# Patient Record
Sex: FEMALE | Race: WHITE | NOT HISPANIC OR LATINO | URBAN - METROPOLITAN AREA
[De-identification: names, ages, dates, MRNs, and addresses within clinical notes are randomized per-mention and may not be internally consistent; named-entity substitution may affect disease eponyms.]

---

## 2017-04-03 ENCOUNTER — INPATIENT (INPATIENT)
Facility: HOSPITAL | Age: 82
LOS: 2 days | Discharge: HOME | DRG: 444 | End: 2017-04-06
Attending: SURGERY | Admitting: SURGERY
Payer: MEDICARE

## 2017-04-03 VITALS
WEIGHT: 130.95 LBS | TEMPERATURE: 98 F | DIASTOLIC BLOOD PRESSURE: 87 MMHG | RESPIRATION RATE: 18 BRPM | OXYGEN SATURATION: 96 % | HEART RATE: 86 BPM | SYSTOLIC BLOOD PRESSURE: 148 MMHG

## 2017-04-03 LAB
ALBUMIN SERPL ELPH-MCNC: 3.1 G/DL — LOW (ref 3.4–5)
ALP SERPL-CCNC: 147 U/L — HIGH (ref 40–120)
ALT FLD-CCNC: 44 U/L — HIGH (ref 12–42)
ANION GAP SERPL CALC-SCNC: 11 MMOL/L — SIGNIFICANT CHANGE UP (ref 9–16)
APTT BLD: 25.7 SEC — LOW (ref 27.5–37.4)
AST SERPL-CCNC: 42 U/L — HIGH (ref 15–37)
BASOPHILS NFR BLD AUTO: 0.1 % — SIGNIFICANT CHANGE UP (ref 0–2)
BILIRUB DIRECT SERPL-MCNC: 0.35 MG/DL — HIGH
BILIRUB INDIRECT FLD-MCNC: 0.4 MG/DL — SIGNIFICANT CHANGE UP (ref 0.2–1)
BILIRUB SERPL-MCNC: 0.8 MG/DL — SIGNIFICANT CHANGE UP (ref 0.2–1.2)
BUN SERPL-MCNC: 24 MG/DL — HIGH (ref 7–23)
CALCIUM SERPL-MCNC: 8.8 MG/DL — SIGNIFICANT CHANGE UP (ref 8.5–10.5)
CHLORIDE SERPL-SCNC: 110 MMOL/L — HIGH (ref 96–108)
CO2 SERPL-SCNC: 22 MMOL/L — SIGNIFICANT CHANGE UP (ref 22–31)
CREAT SERPL-MCNC: 0.64 MG/DL — SIGNIFICANT CHANGE UP (ref 0.5–1.3)
GLUCOSE SERPL-MCNC: 118 MG/DL — HIGH (ref 70–99)
HCT VFR BLD CALC: 31.5 % — LOW (ref 34.5–45)
HGB BLD-MCNC: 10 G/DL — LOW (ref 11.5–15.5)
INR BLD: 1.08 — SIGNIFICANT CHANGE UP (ref 0.88–1.16)
LIDOCAIN IGE QN: 7413 U/L — HIGH (ref 73–393)
LYMPHOCYTES # BLD AUTO: 8.9 % — LOW (ref 13–44)
MCHC RBC-ENTMCNC: 22.6 PG — LOW (ref 27–34)
MCHC RBC-ENTMCNC: 31.7 G/DL — LOW (ref 32–36)
MCV RBC AUTO: 71.3 FL — LOW (ref 80–100)
MONOCYTES NFR BLD AUTO: 5.1 % — SIGNIFICANT CHANGE UP (ref 2–14)
NEUTROPHILS NFR BLD AUTO: 85.9 % — HIGH (ref 43–77)
PLATELET # BLD AUTO: 203 K/UL — SIGNIFICANT CHANGE UP (ref 150–400)
POTASSIUM SERPL-MCNC: 3.5 MMOL/L — SIGNIFICANT CHANGE UP (ref 3.5–5.3)
POTASSIUM SERPL-SCNC: 3.5 MMOL/L — SIGNIFICANT CHANGE UP (ref 3.5–5.3)
PROT SERPL-MCNC: 6.2 G/DL — LOW (ref 6.4–8.2)
PROTHROM AB SERPL-ACNC: 12 SEC — SIGNIFICANT CHANGE UP (ref 9.8–12.7)
RBC # BLD: 4.42 M/UL — SIGNIFICANT CHANGE UP (ref 3.8–5.2)
RBC # FLD: 18.2 % — HIGH (ref 10.3–16.9)
SODIUM SERPL-SCNC: 143 MMOL/L — SIGNIFICANT CHANGE UP (ref 135–145)
WBC # BLD: 10.5 K/UL — SIGNIFICANT CHANGE UP (ref 3.8–10.5)
WBC # FLD AUTO: 10.5 K/UL — SIGNIFICANT CHANGE UP (ref 3.8–10.5)

## 2017-04-03 PROCEDURE — 71010: CPT | Mod: 26

## 2017-04-03 PROCEDURE — 99285 EMERGENCY DEPT VISIT HI MDM: CPT | Mod: 25

## 2017-04-03 PROCEDURE — 93010 ELECTROCARDIOGRAM REPORT: CPT

## 2017-04-03 RX ORDER — MORPHINE SULFATE 50 MG/1
4 CAPSULE, EXTENDED RELEASE ORAL ONCE
Qty: 0 | Refills: 0 | Status: DISCONTINUED | OUTPATIENT
Start: 2017-04-03 | End: 2017-04-03

## 2017-04-03 RX ORDER — SODIUM CHLORIDE 9 MG/ML
1000 INJECTION INTRAMUSCULAR; INTRAVENOUS; SUBCUTANEOUS ONCE
Qty: 0 | Refills: 0 | Status: COMPLETED | OUTPATIENT
Start: 2017-04-03 | End: 2017-04-03

## 2017-04-03 RX ADMIN — SODIUM CHLORIDE 1000 MILLILITER(S): 9 INJECTION INTRAMUSCULAR; INTRAVENOUS; SUBCUTANEOUS at 23:32

## 2017-04-03 NOTE — ED PROVIDER NOTE - OBJECTIVE STATEMENT
88 y/o f hx HTN presents c/o diffuse abd pain, n/v today.  Pt stating she was seen by her GI today, had u/s which shows gallstones and told her labs are indicating pancreatitis.  Pt stating if she doesn't eat or drink she isn't vomiting or uncomfortable.  Denies fever, diarrhea, CP, SOB, all other ROS negative.

## 2017-04-03 NOTE — ED PROVIDER NOTE - ATTENDING CONTRIBUTION TO CARE
87F hx of HTN abdominal pain, nausea, vomiting for past day. Diffuse. Went to GI doctor did labs, ultrasound. Lipase is elevated. Pt with gallstone pancreatitis. Needs repeat ultrasound, labs. Surgery to consult under Dr. Lin.

## 2017-04-04 LAB
ALBUMIN SERPL ELPH-MCNC: 2.7 G/DL — LOW (ref 3.4–5)
ALP SERPL-CCNC: 131 U/L — HIGH (ref 40–120)
ALT FLD-CCNC: 38 U/L — SIGNIFICANT CHANGE UP (ref 12–42)
ANION GAP SERPL CALC-SCNC: 8 MMOL/L — LOW (ref 9–16)
APPEARANCE UR: CLEAR — SIGNIFICANT CHANGE UP
APTT BLD: 28.7 SEC — SIGNIFICANT CHANGE UP (ref 27.5–37.4)
AST SERPL-CCNC: 36 U/L — SIGNIFICANT CHANGE UP (ref 15–37)
BASOPHILS NFR BLD AUTO: 0.2 % — SIGNIFICANT CHANGE UP (ref 0–2)
BILIRUB SERPL-MCNC: 1.2 MG/DL — SIGNIFICANT CHANGE UP (ref 0.2–1.2)
BILIRUB UR-MCNC: NEGATIVE — SIGNIFICANT CHANGE UP
BUN SERPL-MCNC: 19 MG/DL — SIGNIFICANT CHANGE UP (ref 7–23)
CALCIUM SERPL-MCNC: 8.9 MG/DL — SIGNIFICANT CHANGE UP (ref 8.5–10.5)
CHLORIDE SERPL-SCNC: 112 MMOL/L — HIGH (ref 96–108)
CO2 SERPL-SCNC: 23 MMOL/L — SIGNIFICANT CHANGE UP (ref 22–31)
COLOR SPEC: YELLOW — SIGNIFICANT CHANGE UP
CREAT SERPL-MCNC: 0.61 MG/DL — SIGNIFICANT CHANGE UP (ref 0.5–1.3)
DIFF PNL FLD: NEGATIVE — SIGNIFICANT CHANGE UP
GLUCOSE SERPL-MCNC: 94 MG/DL — SIGNIFICANT CHANGE UP (ref 70–99)
GLUCOSE UR QL: NEGATIVE — SIGNIFICANT CHANGE UP
HBA1C BLD-MCNC: 6 % — HIGH (ref 4.8–5.6)
HCT VFR BLD CALC: 30.4 % — LOW (ref 34.5–45)
HGB BLD-MCNC: 9.6 G/DL — LOW (ref 11.5–15.5)
INR BLD: 1.22 — HIGH (ref 0.88–1.16)
KETONES UR-MCNC: NEGATIVE — SIGNIFICANT CHANGE UP
LEUKOCYTE ESTERASE UR-ACNC: NEGATIVE — SIGNIFICANT CHANGE UP
LIDOCAIN IGE QN: 5277 U/L — HIGH (ref 73–393)
LYMPHOCYTES # BLD AUTO: 14.8 % — SIGNIFICANT CHANGE UP (ref 13–44)
MAGNESIUM SERPL-MCNC: 1.8 MG/DL — SIGNIFICANT CHANGE UP (ref 1.6–2.4)
MCHC RBC-ENTMCNC: 22.8 PG — LOW (ref 27–34)
MCHC RBC-ENTMCNC: 31.6 G/DL — LOW (ref 32–36)
MCV RBC AUTO: 72.2 FL — LOW (ref 80–100)
MONOCYTES NFR BLD AUTO: 4.9 % — SIGNIFICANT CHANGE UP (ref 2–14)
NEUTROPHILS NFR BLD AUTO: 80.1 % — HIGH (ref 43–77)
NITRITE UR-MCNC: NEGATIVE — SIGNIFICANT CHANGE UP
PH UR: 5.5 — SIGNIFICANT CHANGE UP (ref 4–8)
PHOSPHATE SERPL-MCNC: 2.1 MG/DL — LOW (ref 2.5–4.5)
PLATELET # BLD AUTO: 206 K/UL — SIGNIFICANT CHANGE UP (ref 150–400)
POTASSIUM SERPL-MCNC: 3.4 MMOL/L — LOW (ref 3.5–5.3)
POTASSIUM SERPL-SCNC: 3.4 MMOL/L — LOW (ref 3.5–5.3)
PROT SERPL-MCNC: 5.8 G/DL — LOW (ref 6.4–8.2)
PROT UR-MCNC: NEGATIVE MG/DL — SIGNIFICANT CHANGE UP
PROTHROM AB SERPL-ACNC: 13.6 SEC — HIGH (ref 9.8–12.7)
RBC # BLD: 4.21 M/UL — SIGNIFICANT CHANGE UP (ref 3.8–5.2)
RBC # FLD: 18.5 % — HIGH (ref 10.3–16.9)
SODIUM SERPL-SCNC: 143 MMOL/L — SIGNIFICANT CHANGE UP (ref 135–145)
SP GR SPEC: 1.02 — SIGNIFICANT CHANGE UP (ref 1–1.03)
UROBILINOGEN FLD QL: 0.2 E.U./DL — SIGNIFICANT CHANGE UP
WBC # BLD: 10.2 K/UL — SIGNIFICANT CHANGE UP (ref 3.8–10.5)
WBC # FLD AUTO: 10.2 K/UL — SIGNIFICANT CHANGE UP (ref 3.8–10.5)

## 2017-04-04 PROCEDURE — 76705 ECHO EXAM OF ABDOMEN: CPT | Mod: 26

## 2017-04-04 PROCEDURE — 99223 1ST HOSP IP/OBS HIGH 75: CPT

## 2017-04-04 PROCEDURE — 74181 MRI ABDOMEN W/O CONTRAST: CPT | Mod: 26

## 2017-04-04 RX ORDER — POTASSIUM PHOSPHATE, MONOBASIC POTASSIUM PHOSPHATE, DIBASIC 236; 224 MG/ML; MG/ML
15 INJECTION, SOLUTION INTRAVENOUS ONCE
Qty: 0 | Refills: 0 | Status: COMPLETED | OUTPATIENT
Start: 2017-04-04 | End: 2017-04-04

## 2017-04-04 RX ORDER — POTASSIUM CHLORIDE 20 MEQ
10 PACKET (EA) ORAL ONCE
Qty: 0 | Refills: 0 | Status: COMPLETED | OUTPATIENT
Start: 2017-04-04 | End: 2017-04-04

## 2017-04-04 RX ORDER — SODIUM CHLORIDE 9 MG/ML
1000 INJECTION, SOLUTION INTRAVENOUS
Qty: 0 | Refills: 0 | Status: DISCONTINUED | OUTPATIENT
Start: 2017-04-04 | End: 2017-04-04

## 2017-04-04 RX ORDER — HEPARIN SODIUM 5000 [USP'U]/ML
5000 INJECTION INTRAVENOUS; SUBCUTANEOUS EVERY 8 HOURS
Qty: 0 | Refills: 0 | Status: DISCONTINUED | OUTPATIENT
Start: 2017-04-04 | End: 2017-04-06

## 2017-04-04 RX ORDER — LATANOPROST 0.05 MG/ML
1 SOLUTION/ DROPS OPHTHALMIC; TOPICAL AT BEDTIME
Qty: 0 | Refills: 0 | Status: DISCONTINUED | OUTPATIENT
Start: 2017-04-04 | End: 2017-04-06

## 2017-04-04 RX ORDER — AMLODIPINE BESYLATE 2.5 MG/1
5 TABLET ORAL DAILY
Qty: 0 | Refills: 0 | Status: DISCONTINUED | OUTPATIENT
Start: 2017-04-04 | End: 2017-04-06

## 2017-04-04 RX ORDER — ONDANSETRON 8 MG/1
4 TABLET, FILM COATED ORAL EVERY 6 HOURS
Qty: 0 | Refills: 0 | Status: DISCONTINUED | OUTPATIENT
Start: 2017-04-04 | End: 2017-04-06

## 2017-04-04 RX ORDER — POTASSIUM CHLORIDE 20 MEQ
10 PACKET (EA) ORAL EVERY 4 HOURS
Qty: 0 | Refills: 0 | Status: DISCONTINUED | OUTPATIENT
Start: 2017-04-04 | End: 2017-04-04

## 2017-04-04 RX ORDER — GLUCAGON INJECTION, SOLUTION 0.5 MG/.1ML
1 INJECTION, SOLUTION SUBCUTANEOUS ONCE
Qty: 0 | Refills: 0 | Status: DISCONTINUED | OUTPATIENT
Start: 2017-04-04 | End: 2017-04-06

## 2017-04-04 RX ORDER — ESOMEPRAZOLE MAGNESIUM 40 MG/1
1 CAPSULE, DELAYED RELEASE ORAL
Qty: 0 | Refills: 0 | COMMUNITY

## 2017-04-04 RX ORDER — ATENOLOL 25 MG/1
12.5 TABLET ORAL
Qty: 0 | Refills: 0 | COMMUNITY

## 2017-04-04 RX ORDER — DEXTROSE 50 % IN WATER 50 %
25 SYRINGE (ML) INTRAVENOUS ONCE
Qty: 0 | Refills: 0 | Status: DISCONTINUED | OUTPATIENT
Start: 2017-04-04 | End: 2017-04-06

## 2017-04-04 RX ORDER — PANTOPRAZOLE SODIUM 20 MG/1
40 TABLET, DELAYED RELEASE ORAL ONCE
Qty: 0 | Refills: 0 | Status: COMPLETED | OUTPATIENT
Start: 2017-04-04 | End: 2017-04-04

## 2017-04-04 RX ORDER — PANTOPRAZOLE SODIUM 20 MG/1
40 TABLET, DELAYED RELEASE ORAL DAILY
Qty: 0 | Refills: 0 | Status: DISCONTINUED | OUTPATIENT
Start: 2017-04-04 | End: 2017-04-06

## 2017-04-04 RX ORDER — ROSUVASTATIN CALCIUM 5 MG/1
1 TABLET ORAL
Qty: 0 | Refills: 0 | COMMUNITY

## 2017-04-04 RX ORDER — BRIMONIDINE TARTRATE 2 MG/MG
1 SOLUTION/ DROPS OPHTHALMIC THREE TIMES A DAY
Qty: 0 | Refills: 0 | Status: DISCONTINUED | OUTPATIENT
Start: 2017-04-04 | End: 2017-04-06

## 2017-04-04 RX ORDER — DEXTROSE 50 % IN WATER 50 %
1 SYRINGE (ML) INTRAVENOUS ONCE
Qty: 0 | Refills: 0 | Status: DISCONTINUED | OUTPATIENT
Start: 2017-04-04 | End: 2017-04-06

## 2017-04-04 RX ORDER — AMLODIPINE BESYLATE 2.5 MG/1
1 TABLET ORAL
Qty: 0 | Refills: 0 | COMMUNITY

## 2017-04-04 RX ORDER — INSULIN LISPRO 100/ML
VIAL (ML) SUBCUTANEOUS EVERY 6 HOURS
Qty: 0 | Refills: 0 | Status: DISCONTINUED | OUTPATIENT
Start: 2017-04-04 | End: 2017-04-06

## 2017-04-04 RX ORDER — SODIUM CHLORIDE 9 MG/ML
1000 INJECTION, SOLUTION INTRAVENOUS
Qty: 0 | Refills: 0 | Status: DISCONTINUED | OUTPATIENT
Start: 2017-04-04 | End: 2017-04-06

## 2017-04-04 RX ORDER — BRIMONIDINE TARTRATE 2 MG/MG
1 SOLUTION/ DROPS OPHTHALMIC
Qty: 0 | Refills: 0 | COMMUNITY

## 2017-04-04 RX ORDER — ATENOLOL 25 MG/1
12.5 TABLET ORAL DAILY
Qty: 0 | Refills: 0 | Status: DISCONTINUED | OUTPATIENT
Start: 2017-04-04 | End: 2017-04-06

## 2017-04-04 RX ORDER — HYDROMORPHONE HYDROCHLORIDE 2 MG/ML
0.5 INJECTION INTRAMUSCULAR; INTRAVENOUS; SUBCUTANEOUS EVERY 4 HOURS
Qty: 0 | Refills: 0 | Status: DISCONTINUED | OUTPATIENT
Start: 2017-04-04 | End: 2017-04-06

## 2017-04-04 RX ORDER — DEXTROSE 50 % IN WATER 50 %
12.5 SYRINGE (ML) INTRAVENOUS ONCE
Qty: 0 | Refills: 0 | Status: DISCONTINUED | OUTPATIENT
Start: 2017-04-04 | End: 2017-04-06

## 2017-04-04 RX ORDER — LATANOPROST 0.05 MG/ML
1 SOLUTION/ DROPS OPHTHALMIC; TOPICAL
Qty: 0 | Refills: 0 | COMMUNITY

## 2017-04-04 RX ORDER — MAGNESIUM SULFATE 500 MG/ML
1 VIAL (ML) INJECTION ONCE
Qty: 0 | Refills: 0 | Status: COMPLETED | OUTPATIENT
Start: 2017-04-04 | End: 2017-04-04

## 2017-04-04 RX ADMIN — AMLODIPINE BESYLATE 5 MILLIGRAM(S): 2.5 TABLET ORAL at 07:43

## 2017-04-04 RX ADMIN — PANTOPRAZOLE SODIUM 40 MILLIGRAM(S): 20 TABLET, DELAYED RELEASE ORAL at 11:22

## 2017-04-04 RX ADMIN — BRIMONIDINE TARTRATE 1 DROP(S): 2 SOLUTION/ DROPS OPHTHALMIC at 14:52

## 2017-04-04 RX ADMIN — PANTOPRAZOLE SODIUM 40 MILLIGRAM(S): 20 TABLET, DELAYED RELEASE ORAL at 03:03

## 2017-04-04 RX ADMIN — POTASSIUM PHOSPHATE, MONOBASIC POTASSIUM PHOSPHATE, DIBASIC 62.5 MILLIMOLE(S): 236; 224 INJECTION, SOLUTION INTRAVENOUS at 18:27

## 2017-04-04 RX ADMIN — SODIUM CHLORIDE 150 MILLILITER(S): 9 INJECTION, SOLUTION INTRAVENOUS at 18:27

## 2017-04-04 RX ADMIN — Medication 100 MILLIEQUIVALENT(S): at 18:27

## 2017-04-04 RX ADMIN — Medication 100 GRAM(S): at 12:28

## 2017-04-04 RX ADMIN — SODIUM CHLORIDE 100 MILLILITER(S): 9 INJECTION, SOLUTION INTRAVENOUS at 03:03

## 2017-04-04 NOTE — H&P ADULT - NSHPPHYSICALEXAM_GEN_ALL_CORE
Vital Signs Last 24 Hrs  T(C): 36.9, Max: 36.9 (04-03 @ 21:55)  T(F): 98.5, Max: 98.5 (04-03 @ 21:55)  HR: 86 (86 - 86)  BP: 148/87 (148/87 - 148/87)  BP(mean): --  RR: 18 (18 - 18)  SpO2: 96% (96% - 96%)    Physical Exam:   Gen: AOx3, pleasant in NAD  Cor: RRR, +S1S2, no MRG  Pulm: Decreased inspiratory effort, right basilar inspiratory crackles  Abd: +BS, Softly distended, TTP in epigastrium, guarding to deep palpation, distractable  Ext: WWP, edema +2 in b/l LE, DP +2 b/l

## 2017-04-04 NOTE — H&P ADULT - NSHPLABSRESULTS_GEN_ALL_CORE
LABS:  CBC Full  -  ( 2017 22:34 )  WBC Count : 10.5 K/uL  Hemoglobin : 10.0 g/dL  Hematocrit : 31.5 %  Platelet Count - Automated : 203 K/uL  Mean Cell Volume : 71.3 fL  Mean Cell Hemoglobin : 22.6 pg  Mean Cell Hemoglobin Concentration : 31.7 g/dL  Auto Neutrophil # : x  Auto Lymphocyte # : x  Auto Monocyte # : x  Auto Eosinophil # : x  Auto Basophil # : x  Auto Neutrophil % : 85.9 %  Auto Lymphocyte % : 8.9 %  Auto Monocyte % : 5.1 %  Auto Eosinophil % : x  Auto Basophil % : 0.1 %    2017 22:34    143    |  110    |  24     ----------------------------<  118    3.5     |  22     |  0.64     Ca    8.8        2017 22:34    TPro  6.2    /  Alb  3.1    /  TBili  0.8    /  DBili  0.35   /  AST  42     /  ALT  44     /  AlkPhos  147    2017 22:34    PT/INR - ( 2017 22:34 )   PT: 12.0 sec;   INR: 1.08        PTT - ( 2017 22:34 )  PTT:25.7 sec    Urinalysis Basic - ( 2017 01:02 )    Color: Yellow / Appearance: Clear / S.025 / pH: x  Gluc: x / Ketone: NEGATIVE  / Bili: NEGATIVE / Urobili: 0.2 E.U./dL   Blood: x / Protein: NEGATIVE mg/dL / Nitrite: NEGATIVE   Leuk Esterase: NEGATIVE / RBC: x / WBC x   Sq Epi: x / Non Sq Epi: x / Bacteria: x    RADIOLOGY & ADDITIONAL STUDIES (The following images were personally reviewed):  INTERPRETATION:  RESIDENT PRELIMINARY REPORT    RIGHT UPPER QUADRANT ULTRASOUND dated 2017 12:22 AM    INDICATION: 87-year-old female with right upper quadrant pain.    COMPARISON: CT abdomen and pelvis 10/24/2014    FINDINGS:     Liver: Normal echogenicity with no visible focal abnormality. Liver span   is normal, measuring 16.9 cm in craniocaudal dimension.    Intrahepatic ducts: Not dilated.    Portal veins: Normal hepatopetal flow.    Common bile duct: Mildly dilated measuring up to 1.0 cm.    Gallbladder: Shadowing gallstones. No wall thickening or pericholecystic   fluid.    Sonographic Mesa sign.    Pancreas: The visualized portions were normal in appearance.      Abdominal aorta: Not adequately visualized due to overlying bowel gas.    Inferior vena cava: Not adequately visualized due to overlying bowel gas.    Right kidney:Diffusely increased renal echogenicity. Mild cortical   thinning. Mildly enlarged with a length of 12.2 cm.    Ascites: None      IMPRESSION:  1.  Cholelithiasis with mild dilatation of the common bile duct. No   pericholecystic fluid, gallbladder wall thickening or sonographic Mesa   sign. Low suspicion for acute cholecystitis. If further evaluation is   clinically indicated, nuclear medicine HIDA scan may be obtained.  2.  Mildly enlarged right kidney with cortical thinning and diffuse   increased renal echogenicity, compatible with medical renal disease.

## 2017-04-04 NOTE — PROGRESS NOTE ADULT - SUBJECTIVE AND OBJECTIVE BOX
O/N: admitted from ED. Afebrile, VSS. ЕКАТЕРИНА O/N: admitted from ED. Afebrile, VSS. ЕКАТЕРИНА     SUBJECTIVE: Pain improved.  Patient seen and examined bedside by chief resident.    heparin  Injectable 5000Unit(s) SubCutaneous every 8 hours  amLODIPine   Tablet 5milliGRAM(s) Oral daily  ATENolol  Tablet 12.5milliGRAM(s) Oral daily      Vital Signs Last 24 Hrs  T(C): 37.4, Max: 37.4 ( @ 04:38)  T(F): 99.4, Max: 99.4 ( @ 04:38)  HR: 69 (69 - 86)  BP: 132/63 (124/69 - 154/74)  BP(mean): 81 (81 - 103)  RR: 16 (16 - 18)  SpO2: 95% (94% - 96%)  I&O's Detail      General: NAD, resting comfortably in bed  C/V: NSR  Pulm: Nonlabored breathing, no respiratory distress, slight crackles in RLL  Abd: softly distended, tender in RUQ  Extrem: WWP, no edema        LABS:                        9.6    10.2  )-----------( 206      ( 2017 06:21 )             30.4     2017 06:21    143    |  112    |  19     ----------------------------<  94     3.4     |  23     |  0.61     Ca    8.9        2017 06:21    TPro  5.8    /  Alb  2.7    /  TBili  1.2    /  DBili  x      /  AST  36     /  ALT  38     /  AlkPhos  131    2017 06:21    PT/INR - ( 2017 06:21 )   PT: 13.6 sec;   INR: 1.22          PTT - ( 2017 06:21 )  PTT:28.7 sec  Urinalysis Basic - ( 2017 01:02 )    Color: Yellow / Appearance: Clear / S.025 / pH: x  Gluc: x / Ketone: NEGATIVE  / Bili: NEGATIVE / Urobili: 0.2 E.U./dL   Blood: x / Protein: NEGATIVE mg/dL / Nitrite: NEGATIVE   Leuk Esterase: NEGATIVE / RBC: x / WBC x   Sq Epi: x / Non Sq Epi: x / Bacteria: x

## 2017-04-04 NOTE — H&P ADULT - PMH
Calculus of kidney  Staghorn calculus  Essential hypertension  HTN (hypertension)  Glaucoma  Glaucoma

## 2017-04-04 NOTE — H&P ADULT - ASSESSMENT
87F pt with PMH HTN, DM, HLD, now with mild to moderate gallstone pancreatitis    -Admit to Gen Surg, Riley, Malcolm  -NPO  -LR @ 100  -Strict I/O  -VS q4h  -PRN Pain/Nausea control (patient only with pain with oral intake)  -Home meds  -DVT ppx  -Patient with dilated CBD on RUQ U/S, though no stone visible, cannot r/o CBD stone - Will obtain MRCP in AM  -F/U AM labs

## 2017-04-04 NOTE — H&P ADULT - NSHPSOCIALHISTORY_GEN_ALL_CORE
Lives with grandchild, independent on activities of daily living. Becomes SOB on 1 flight of steps, ambulatory mainly around her household.  Denies any prior history of tobacco or EtOH use.

## 2017-04-04 NOTE — PROGRESS NOTE ADULT - ASSESSMENT
87F pt with PMH HTN, DM, HLD, now with mild to moderate gallstone pancreatitis    -NPO  -LR @ 100  -Strict I/O  -VS q4h  -PRN Pain/Nausea control (patient only with pain with oral intake)  -Home meds  -DVT ppx  -Patient with dilated CBD on RUQ U/S, though no stone visible, cannot r/o CBD stone - Will obtain MRCP in AM  -F/U AM labs 87F pt with PMH HTN, DM, HLD, now with mild to moderate gallstone pancreatitis    -NPO  -LR @ 150  -Strict I/O  -VS q4h  -PRN Pain/Nausea control (patient only with pain with oral intake)  -Home meds  -DVT ppx  -Patient with dilated CBD on RUQ U/S, though no stone visible, cannot r/o CBD stone - Will obtain MRCP in AM  -F/U AM labs  -Possible abx

## 2017-04-04 NOTE — CONSULT NOTE ADULT - SUBJECTIVE AND OBJECTIVE BOX
HPI:  87F pt w/PMH HTN, HLD, DM, glaucoma, chronic anemia, R staghorn calculi s/p ureteroscopic resection, PSH JACKIE, presents to [facility] ED sent in by Dr. White after diagnosing the patient in office with pancreatitis. The patient is accompanied by daughter and son who help translate to english, tho patient understands and speaks english well. The patient says that she began to experience severe abdominal pain yesterday morning after waking up and having a few sips of water, diffuse, non radiating, worsened with oral intake, no alleviating factors. Admits to experiencing similar abdominal pain about 3 weeks ago, though this was epigastric in location, saw her GI Dr who diagnosed her with "gallstones" and started her on a PPI. Todays pain has been accompanied with nausea and vomiting, inability to intake any oral liquid or food. Denies any fevers, chills, or sweats. Denies any cp/sob/palp. Of note patient, patient had a colonsocopy about 15 years ago, which showed polyps. She was admitted to [facility] for her staghorn calculi two years ago, here, an Echo was performed which showed an EF of 50-55%. (2017 01:03)      PAST MEDICAL & SURGICAL HISTORY:  Glaucoma: Glaucoma  Essential hypertension: HTN (hypertension)  Calculus of kidney: Staghorn calculus  Status post total hysterectomy: History of hysterectomy      MEDICATIONS:  heparin  Injectable 5000Unit(s) SubCutaneous every 8 hours  HYDROmorphone  Injectable 0.5milliGRAM(s) IV Push every 4 hours PRN  ondansetron Injectable 4milliGRAM(s) IV Push every 6 hours PRN  amLODIPine   Tablet 5milliGRAM(s) Oral daily  pantoprazole  Injectable 40milliGRAM(s) IV Push daily  latanoprost 0.005% Ophthalmic Solution 1Drop(s) Both EYES at bedtime  ATENolol  Tablet 12.5milliGRAM(s) Oral daily  brimonidine 0.2% Ophthalmic Solution 1Drop(s) Both EYES three times a day  insulin lispro (HumaLOG) corrective regimen sliding scale  SubCutaneous every 6 hours  dextrose 5%. 1000milliLiter(s) IV Continuous <Continuous>  dextrose Gel 1Dose(s) Oral once PRN  dextrose 50% Injectable 12.5Gram(s) IV Push once  dextrose 50% Injectable 25Gram(s) IV Push once  dextrose 50% Injectable 25Gram(s) IV Push once  glucagon  Injectable 1milliGRAM(s) IntraMuscular once PRN  lactated ringers 1000milliLiter(s) IV Continuous <Continuous>  potassium phosphate IVPB 15milliMole(s) IV Intermittent once  potassium chloride  10 mEq/100 mL IVPB 10milliEquivalent(s) IV Intermittent once      No pertinent family history in first degree relatives      SOCIAL HISTORY:    REVIEW OF SYSTEMS:  Constitutional: No fevers.   Card: No chest pain.   Resp: No SOB.  GI: No nausea or vomiting. No abdominal pain.  : No dysuria. Neuro: No focal weakness or sensory loss.  Eyes: No visual symptoms. MS: No joint swelling.  Skin: No rashes. Psych: No psychosis.    PHYSICAL EXAM:    I & Os for current day (as of  @ 17:22)  =============================================  IN: 450 ml / OUT: 250 ml / NET: 200 ml    Constitutional: T(C): 36.2, Max: 37.4 ( @ 04:38)  HR: 68 (68 - 86)  BP: 123/59 (123/59 - 154/74)  RR: 16 (16 - 18)  SpO2: 92% (91% - 96%)  Wt(kg): --  Appearance: Alert, pleasant, INAD.  Eyes: Conjunctivae pink, moist. Pupils equal and reactive.  ENT: External ears/nose normal appearing. Lips normal, dentition good.  Lymphatic: No cervical lymphadenopathy. No supraclavicular lymphadenopathy.  Cardiac: No rubs. NO MURMURS. Extremities: NO LE EDEMA.  Respiratory: Effort no accessory muscle use. Lungs: CLEAR TO AUSCULTATION.  Abdomen: Soft. No masses. Nontender. Liver: Not palpable. Spleen: Not palpable.  Musculoskeltal digits: No clubbing or cyanosis. Tone normal. Moves all four extremities.  Skin inspection: No rashes. Palpation: No abnormalities.  Neuro: Cranial nerves: no facial assymetry or deficits noted. Sensation: LE intact to light touch.  Psych: Oriented to person, place, situation. Mood/affect: Not depressed.     DATA:  143    |  112<H>  |  19     ----------------------------<  94     Ca:8.9   (2017 06:21)  3.4<L>   |  23     |  0.61       eGFR if Non : 82  eGFR if : 94    TPro  5.8 g/dL<L>  /  Alb  2.7 g/dL<L>  /  TBili  1.2 mg/dL  /  DBili  x      /  AST  36 U/L  /  ALT  38 U/L  /  AlkPhos  131 U/L<H>  2017 06:21                        9.6<L>  10.2  )-----------( 206      ( 2017 06:21 )             30.4<L>    Phos:2.1 mg/dL<L> M.8 mg/dL PTH:-- Uric acid:-- Serum Osm:--  Ferritin:-- Iron:-- TIBC:-- Tsat:--  B12:-- TSH:-- ( @ 06:21)    Urinalysis Basic - ( 2017 01:02 )  Color: Yellow / Appearance: Clear / S.025 / pH: x  Gluc: x / Ketone: NEGATIVE  / Bili: NEGATIVE / Urobili: 0.2 E.U./dL   Blood: x / Protein: NEGATIVE mg/dL / Nitrite: NEGATIVE   Leuk Esterase: NEGATIVE / RBC: x / WBC x   Sq Epi: x / Non Sq Epi: x / Bacteria: x HPI:  87F pt admitted with gallstone pancreatitis, awaiting MRCP. Pain has now resolved. Afebrile. HTN controlled. Slight hypokalemia.       PAST MEDICAL & SURGICAL HISTORY:  Glaucoma: Glaucoma  Essential hypertension: HTN (hypertension)  Calculus of kidney: Staghorn calculus  Status post total hysterectomy: History of hysterectomy      MEDICATIONS:  heparin  Injectable 5000Unit(s) SubCutaneous every 8 hours  HYDROmorphone  Injectable 0.5milliGRAM(s) IV Push every 4 hours PRN  ondansetron Injectable 4milliGRAM(s) IV Push every 6 hours PRN  amLODIPine   Tablet 5milliGRAM(s) Oral daily  pantoprazole  Injectable 40milliGRAM(s) IV Push daily  latanoprost 0.005% Ophthalmic Solution 1Drop(s) Both EYES at bedtime  ATENolol  Tablet 12.5milliGRAM(s) Oral daily  brimonidine 0.2% Ophthalmic Solution 1Drop(s) Both EYES three times a day  insulin lispro (HumaLOG) corrective regimen sliding scale  SubCutaneous every 6 hours  dextrose 5%. 1000milliLiter(s) IV Continuous <Continuous>  dextrose Gel 1Dose(s) Oral once PRN  dextrose 50% Injectable 12.5Gram(s) IV Push once  dextrose 50% Injectable 25Gram(s) IV Push once  dextrose 50% Injectable 25Gram(s) IV Push once  glucagon  Injectable 1milliGRAM(s) IntraMuscular once PRN  lactated ringers 1000milliLiter(s) IV Continuous <Continuous>  potassium phosphate IVPB 15milliMole(s) IV Intermittent once  potassium chloride  10 mEq/100 mL IVPB 10milliEquivalent(s) IV Intermittent once      No pertinent family history in first degree relatives      SOCIAL HISTORY:    REVIEW OF SYSTEMS:  Constitutional: No fevers.   Card: No chest pain.   Resp: No SOB.  GI: No nausea or vomiting. No abdominal pain.  : No dysuria. Neuro: No focal weakness or sensory loss.  Eyes: No visual symptoms. MS: No joint swelling.  Skin: No rashes. Psych: No psychosis.    PHYSICAL EXAM:    I & Os for current day (as of  @ 17:22)  =============================================  IN: 450 ml / OUT: 250 ml / NET: 200 ml    Constitutional: T(C): 36.2, Max: 37.4 (-04 @ 04:38)  HR: 68 (68 - 86)  BP: 123/59 (123/59 - 154/74)  RR: 16 (16 - 18)  SpO2: 92% (91% - 96%)  Wt(kg): --  Appearance: Alert, pleasant, INAD.  Eyes: Conjunctivae pink, moist. Pupils equal and reactive.  ENT: External ears/nose normal appearing. Lips normal, dentition good.  Lymphatic: No cervical lymphadenopathy. No supraclavicular lymphadenopathy.  Cardiac: No rubs. NO MURMURS. Extremities: NO LE EDEMA.  Respiratory: Effort no accessory muscle use. Lungs: CLEAR TO AUSCULTATION.  Abdomen: Soft. No masses. Nontender. Liver: Not palpable. Spleen: Not palpable.  Musculoskeltal digits: No clubbing or cyanosis. Tone normal. Moves all four extremities.  Skin inspection: No rashes. Palpation: No abnormalities.  Neuro: Cranial nerves: no facial assymetry or deficits noted. Sensation: LE intact to light touch.  Psych: Oriented to person, place, situation. Mood/affect: Not depressed.     DATA:  143    |  112<H>  |  19     ----------------------------<  94     Ca:8.9   (2017 06:21)  3.4<L>   |  23     |  0.61       eGFR if Non : 82  eGFR if : 94    TPro  5.8 g/dL<L>  /  Alb  2.7 g/dL<L>  /  TBili  1.2 mg/dL  /  DBili  x      /  AST  36 U/L  /  ALT  38 U/L  /  AlkPhos  131 U/L<H>  2017 06:21                        9.6<L>  10.2  )-----------( 206      ( 2017 06:21 )             30.4<L>    Phos:2.1 mg/dL<L> M.8 mg/dL PTH:-- Uric acid:-- Serum Osm:--  Ferritin:-- Iron:-- TIBC:-- Tsat:--  B12:-- TSH:-- ( @ 06:21)    Urinalysis Basic - ( 2017 01:02 )  Color: Yellow / Appearance: Clear / S.025 / pH: x  Gluc: x / Ketone: NEGATIVE  / Bili: NEGATIVE / Urobili: 0.2 E.U./dL   Blood: x / Protein: NEGATIVE mg/dL / Nitrite: NEGATIVE   Leuk Esterase: NEGATIVE / RBC: x / WBC x   Sq Epi: x / Non Sq Epi: x / Bacteria: x HPI:  87F pt admitted with gallstone pancreatitis, awaiting MRCP. Pain has now resolved. Afebrile. HTN controlled. Slight hypokalemia. Worsened anemia, HGB decreased to 9.6.       PAST MEDICAL & SURGICAL HISTORY:  Glaucoma: Glaucoma  Essential hypertension: HTN (hypertension)  Calculus of kidney: Staghorn calculus  Status post total hysterectomy: History of hysterectomy      MEDICATIONS:  heparin  Injectable 5000Unit(s) SubCutaneous every 8 hours  HYDROmorphone  Injectable 0.5milliGRAM(s) IV Push every 4 hours PRN  ondansetron Injectable 4milliGRAM(s) IV Push every 6 hours PRN  amLODIPine   Tablet 5milliGRAM(s) Oral daily  pantoprazole  Injectable 40milliGRAM(s) IV Push daily  latanoprost 0.005% Ophthalmic Solution 1Drop(s) Both EYES at bedtime  ATENolol  Tablet 12.5milliGRAM(s) Oral daily  brimonidine 0.2% Ophthalmic Solution 1Drop(s) Both EYES three times a day  insulin lispro (HumaLOG) corrective regimen sliding scale  SubCutaneous every 6 hours  dextrose 5%. 1000milliLiter(s) IV Continuous <Continuous>  dextrose Gel 1Dose(s) Oral once PRN  dextrose 50% Injectable 12.5Gram(s) IV Push once  dextrose 50% Injectable 25Gram(s) IV Push once  dextrose 50% Injectable 25Gram(s) IV Push once  glucagon  Injectable 1milliGRAM(s) IntraMuscular once PRN  lactated ringers 1000milliLiter(s) IV Continuous <Continuous>  potassium phosphate IVPB 15milliMole(s) IV Intermittent once  potassium chloride  10 mEq/100 mL IVPB 10milliEquivalent(s) IV Intermittent once      No pertinent family history in first degree relatives      SOCIAL HISTORY:    REVIEW OF SYSTEMS:  Constitutional: No fevers.   Card: No chest pain.   Resp: No SOB.  GI: No nausea or vomiting. No abdominal pain.  : No dysuria. Neuro: No focal weakness or sensory loss.  Eyes: No visual symptoms. MS: No joint swelling.  Skin: No rashes. Psych: No psychosis.    PHYSICAL EXAM:    I & Os for current day (as of  @ 17:22)  =============================================  IN: 450 ml / OUT: 250 ml / NET: 200 ml    Constitutional: T(C): 36.2, Max: 37.4 (- @ 04:38)  HR: 68 (68 - 86)  BP: 123/59 (123/59 - 154/74)  RR: 16 (16 - 18)  SpO2: 92% (91% - 96%)  Wt(kg): --  Appearance: Alert, pleasant, INAD.  Eyes: Conjunctivae pink, moist. Pupils equal and reactive.  ENT: External ears/nose normal appearing. Lips normal, dentition good.  Lymphatic: No cervical lymphadenopathy. No supraclavicular lymphadenopathy.  Cardiac: No rubs. NO MURMURS. Extremities: PITTING BILATERAL ANKLE EDEMA  Respiratory: Effort no accessory muscle use. Lungs: CLEAR TO AUSCULTATION.  Abdomen: Soft. No masses. TENDER TO DEEP PALP RUQ. Liver: Not palpable. Spleen: Not palpable.  Musculoskeltal digits: No clubbing or cyanosis. Tone normal. Moves all four extremities.  Skin inspection: No rashes. Palpation: No abnormalities.  Neuro: Cranial nerves: no facial assymetry or deficits noted. Sensation: LE intact to light touch.  Psych: Oriented to person, place, situation. Mood/affect: Not depressed.     DATA:  143    |  112<H>  |  19     ----------------------------<  94     Ca:8.9   (2017 06:21)  3.4<L>   |  23     |  0.61       eGFR if Non : 82  eGFR if : 94    TPro  5.8 g/dL<L>  /  Alb  2.7 g/dL<L>  /  TBili  1.2 mg/dL  /  DBili  x      /  AST  36 U/L  /  ALT  38 U/L  /  AlkPhos  131 U/L<H>  2017 06:21                        9.6<L>  10.2  )-----------( 206      ( 2017 06:21 )             30.4<L>    Phos:2.1 mg/dL<L> M.8 mg/dL PTH:-- Uric acid:-- Serum Osm:--  Ferritin:-- Iron:-- TIBC:-- Tsat:--  B12:-- TSH:-- ( @ 06:21)    Urinalysis Basic - ( 2017 01:02 )  Color: Yellow / Appearance: Clear / S.025 / pH: x  Gluc: x / Ketone: NEGATIVE  / Bili: NEGATIVE / Urobili: 0.2 E.U./dL   Blood: x / Protein: NEGATIVE mg/dL / Nitrite: NEGATIVE   Leuk Esterase: NEGATIVE / RBC: x / WBC x   Sq Epi: x / Non Sq Epi: x / Bacteria: x

## 2017-04-04 NOTE — H&P ADULT - HISTORY OF PRESENT ILLNESS
87F pt w/PMH HTN, HLD, DM, glaucoma, chronic anemia, R staghorn calculi s/p ureteroscopic resection, PSH JACKIE, presents to [facility] ED sent in by Dr. White after diagnosing the patient in office with pancreatitis. The patient is accompanied by daughter and son who help translate to english, tho patient understands and speaks english well. The patient says that she began to experience severe abdominal pain yesterday morning after waking up and having a few sips of water, diffuse, non radiating, worsened with oral intake, no alleviating factors. Admits to experiencing similar abdominal pain about 3 weeks ago, though this was epigastric in location, saw her GI Dr who diagnosed her with "gallstones" and started her on a PPI. Todays pain has been accompanied with nausea and vomiting, inability to intake any oral liquid or food. Denies any fevers, chills, or sweats. Denies any cp/sob/palp. Of note patient, patient had a colonsocopy about 15 years ago, which showed polyps. She was admitted to [facility] for her staghorn calculi two years ago, here, an Echo was performed which showed an EF of 50-55%.

## 2017-04-04 NOTE — CONSULT NOTE ADULT - ASSESSMENT
Gallstone pancreatitis. Hypokalemia. Anemia of inflammation. HTN controlled.    Suggest:    1. IVF (D5LR or LR) while NPO.  2. Keep K>4 mg>2.  3. Continue atenolol and norvasc.  4. Check iron studies, b12, TSH.    Please call with any questions.    Byron Chilel MD, FACP, FASN | kidney.[facility]  Nephrology, Hypertension, and Internal Medicine  Mobile: [phone] (Daytime Hours Only)  Office/Answering Service: [phone]  Asst. Prof. of Medicine, [facility]  [facility] - Nephrology at [address]  [address], [address], [place], [place]

## 2017-04-05 LAB
ALBUMIN SERPL ELPH-MCNC: 2.6 G/DL — LOW (ref 3.4–5)
ALP SERPL-CCNC: 164 U/L — HIGH (ref 40–120)
ALT FLD-CCNC: 40 U/L — SIGNIFICANT CHANGE UP (ref 12–42)
ANION GAP SERPL CALC-SCNC: 9 MMOL/L — SIGNIFICANT CHANGE UP (ref 9–16)
AST SERPL-CCNC: 34 U/L — SIGNIFICANT CHANGE UP (ref 15–37)
BILIRUB SERPL-MCNC: 1 MG/DL — SIGNIFICANT CHANGE UP (ref 0.2–1.2)
BUN SERPL-MCNC: 11 MG/DL — SIGNIFICANT CHANGE UP (ref 7–23)
CALCIUM SERPL-MCNC: 8.6 MG/DL — SIGNIFICANT CHANGE UP (ref 8.5–10.5)
CHLORIDE SERPL-SCNC: 108 MMOL/L — SIGNIFICANT CHANGE UP (ref 96–108)
CO2 SERPL-SCNC: 23 MMOL/L — SIGNIFICANT CHANGE UP (ref 22–31)
CREAT SERPL-MCNC: 0.52 MG/DL — SIGNIFICANT CHANGE UP (ref 0.5–1.3)
GLUCOSE SERPL-MCNC: 77 MG/DL — SIGNIFICANT CHANGE UP (ref 70–99)
HCT VFR BLD CALC: 30.7 % — LOW (ref 34.5–45)
HCT VFR BLD CALC: 34 % — LOW (ref 34.5–45)
HGB BLD-MCNC: 10.6 G/DL — LOW (ref 11.5–15.5)
HGB BLD-MCNC: 9.5 G/DL — LOW (ref 11.5–15.5)
LIDOCAIN IGE QN: 2409 U/L — HIGH (ref 73–393)
MAGNESIUM SERPL-MCNC: 2 MG/DL — SIGNIFICANT CHANGE UP (ref 1.6–2.4)
MCHC RBC-ENTMCNC: 22.6 PG — LOW (ref 27–34)
MCHC RBC-ENTMCNC: 22.7 PG — LOW (ref 27–34)
MCHC RBC-ENTMCNC: 30.9 G/DL — LOW (ref 32–36)
MCHC RBC-ENTMCNC: 31.2 G/DL — LOW (ref 32–36)
MCV RBC AUTO: 72.8 FL — LOW (ref 80–100)
MCV RBC AUTO: 73.1 FL — LOW (ref 80–100)
PHOSPHATE SERPL-MCNC: 1.6 MG/DL — LOW (ref 2.5–4.5)
PLATELET # BLD AUTO: 172 K/UL — SIGNIFICANT CHANGE UP (ref 150–400)
PLATELET # BLD AUTO: 173 K/UL — SIGNIFICANT CHANGE UP (ref 150–400)
POTASSIUM SERPL-MCNC: 3.5 MMOL/L — SIGNIFICANT CHANGE UP (ref 3.5–5.3)
POTASSIUM SERPL-SCNC: 3.5 MMOL/L — SIGNIFICANT CHANGE UP (ref 3.5–5.3)
PROT SERPL-MCNC: 5.6 G/DL — LOW (ref 6.4–8.2)
RBC # BLD: 4.2 M/UL — SIGNIFICANT CHANGE UP (ref 3.8–5.2)
RBC # BLD: 4.67 M/UL — SIGNIFICANT CHANGE UP (ref 3.8–5.2)
RBC # FLD: 18.6 % — HIGH (ref 10.3–16.9)
RBC # FLD: 18.7 % — HIGH (ref 10.3–16.9)
SODIUM SERPL-SCNC: 140 MMOL/L — SIGNIFICANT CHANGE UP (ref 135–145)
WBC # BLD: 7.4 K/UL — SIGNIFICANT CHANGE UP (ref 3.8–10.5)
WBC # BLD: 7.9 K/UL — SIGNIFICANT CHANGE UP (ref 3.8–10.5)
WBC # FLD AUTO: 7.4 K/UL — SIGNIFICANT CHANGE UP (ref 3.8–10.5)
WBC # FLD AUTO: 7.9 K/UL — SIGNIFICANT CHANGE UP (ref 3.8–10.5)

## 2017-04-05 PROCEDURE — 43274 ERCP DUCT STENT PLACEMENT: CPT

## 2017-04-05 PROCEDURE — 43264 ERCP REMOVE DUCT CALCULI: CPT

## 2017-04-05 PROCEDURE — 99233 SBSQ HOSP IP/OBS HIGH 50: CPT

## 2017-04-05 RX ORDER — POTASSIUM PHOSPHATE, MONOBASIC POTASSIUM PHOSPHATE, DIBASIC 236; 224 MG/ML; MG/ML
21 INJECTION, SOLUTION INTRAVENOUS ONCE
Qty: 0 | Refills: 0 | Status: COMPLETED | OUTPATIENT
Start: 2017-04-05 | End: 2017-04-05

## 2017-04-05 RX ADMIN — PANTOPRAZOLE SODIUM 40 MILLIGRAM(S): 20 TABLET, DELAYED RELEASE ORAL at 13:20

## 2017-04-05 RX ADMIN — LATANOPROST 1 DROP(S): 0.05 SOLUTION/ DROPS OPHTHALMIC; TOPICAL at 22:34

## 2017-04-05 RX ADMIN — AMLODIPINE BESYLATE 5 MILLIGRAM(S): 2.5 TABLET ORAL at 07:10

## 2017-04-05 RX ADMIN — POTASSIUM PHOSPHATE, MONOBASIC POTASSIUM PHOSPHATE, DIBASIC 64.25 MILLIMOLE(S): 236; 224 INJECTION, SOLUTION INTRAVENOUS at 09:41

## 2017-04-05 RX ADMIN — ATENOLOL 12.5 MILLIGRAM(S): 25 TABLET ORAL at 22:36

## 2017-04-05 RX ADMIN — BRIMONIDINE TARTRATE 1 DROP(S): 2 SOLUTION/ DROPS OPHTHALMIC at 15:31

## 2017-04-05 NOTE — PROGRESS NOTE ADULT - SUBJECTIVE AND OBJECTIVE BOX
CC: PANCREATITIS      INTERVAL HISTORY:This 89 yo lady with hixtory of nephrolithiasis is now adm with pancreatitis, and w/u showing  cbd obst with stones  now feels well pre ercp but very apprehensive about anesthesia .    ROS: No chest pain. No shortness of breath. No nausea.    PAST MEDICAL & SURGICAL HISTORY:  Glaucoma: Glaucoma  Essential hypertension: HTN (hypertension)  Calculus of kidney: Staghorn calculus  Status post total hysterectomy: History of hysterectomy      PHYSICAL EXAM:  T(C): 36.6, Max: 36.7 ( @ 14:30)  HR: 84  BP: 178/81 (157/80 - 178/81)  RR: 16  SpO2: 93%  Wt(kg): --  I&O's Summary  I & Os for 24h ending 2017 07:00   =============================================  IN: 750 ml / OUT: 250 ml / NET: 500 ml    I & Os for current day (as of 2017 23:53)  =============================================  IN: 0 ml / OUT: 500 ml / NET: -500 ml    Weight 59.4 ( @ 21:55)    Appearance: alert, pleasant, INAD.  ENT: oral mucosa moist, no pallor/cyanosis.  Neck: no JVD visible.  Cardiac: no rubs. no murmurs. Extremities: NO EDEMA.  Skin: no rashes.  Extremities (digits): no clubbing or cyanosis.  Respratory effort: no access muscle use. Lungs: CLEAR TO AUSCULTATION.  Abdomen: soft. nontender. no masses.  Psych affect: not depressed. Orientation: person, place, situation.     MEDICATIONS  (STANDING):  heparin  Injectable 5000Unit(s) SubCutaneous every 8 hours  amLODIPine   Tablet 5milliGRAM(s) Oral daily  pantoprazole  Injectable 40milliGRAM(s) IV Push daily  latanoprost 0.005% Ophthalmic Solution 1Drop(s) Both EYES at bedtime  ATENolol  Tablet 12.5milliGRAM(s) Oral daily  brimonidine 0.2% Ophthalmic Solution 1Drop(s) Both EYES three times a day  insulin lispro (HumaLOG) corrective regimen sliding scale  SubCutaneous every 6 hours  dextrose 5%. 1000milliLiter(s) IV Continuous <Continuous>  dextrose 50% Injectable 12.5Gram(s) IV Push once  dextrose 50% Injectable 25Gram(s) IV Push once  dextrose 50% Injectable 25Gram(s) IV Push once  lactated ringers 1000milliLiter(s) IV Continuous <Continuous>    MEDICATIONS  (PRN):  HYDROmorphone  Injectable 0.5milliGRAM(s) IV Push every 4 hours PRN Severe Pain  ondansetron Injectable 4milliGRAM(s) IV Push every 6 hours PRN Nausea  dextrose Gel 1Dose(s) Oral once PRN Blood Glucose LESS THAN 70 milliGRAM(s)/deciliter  glucagon  Injectable 1milliGRAM(s) IntraMuscular once PRN Glucose LESS THAN 70 milligrams/deciliter      DATA:  140    |  108    |  11     ----------------------------<  77     Ca:8.6   (2017 06:11)  3.5     |  23     |  0.52       eGFR if Non : 86  eGFR if : 99    TPro  5.6 g/dL<L>  /  Alb  2.6 g/dL<L>  /  TBili  1.0 mg/dL  /  DBili  x      /  AST  34 U/L  /  ALT  40 U/L  /  AlkPhos  164 U/L<H>  2017 06:11                        10.6<L>  7.4   )-----------( 172      ( 2017 20:14 )             34.0<L>        Urinalysis Basic - ( 2017 01:02 )  Color: Yellow / Appearance: Clear / S.025 / pH: x  Gluc: x / Ketone: NEGATIVE  / Bili: NEGATIVE / Urobili: 0.2 E.U./dL   Blood: x / Protein: NEGATIVE mg/dL / Nitrite: NEGATIVE   Leuk Esterase: NEGATIVE / RBC: x / WBC x   Sq Epi: x / Non Sq Epi: x / Bacteria: x

## 2017-04-05 NOTE — PROGRESS NOTE ADULT - SUBJECTIVE AND OBJECTIVE BOX
O/N: ЕКАТЕРИНА , VSS , MRCP :intra-and extrahepatic biliary ductal dilatation with the common bile   duct measuring up to 12 mm. Multiple stones in the distal common bile duct, largest 9 mm. Cholelithiasis. Pericholecystic fluid and edematous changes suggestive of cholecystitis. O/N: ЕКАТЕРИНА , VSS      SUBJECTIVE: Patient w/o complaints. Patient seen and examined bedside by chief resident.    heparin  Injectable 5000Unit(s) SubCutaneous every 8 hours  amLODIPine   Tablet 5milliGRAM(s) Oral daily  ATENolol  Tablet 12.5milliGRAM(s) Oral daily      Vital Signs Last 24 Hrs  T(C): 36.6, Max: 36.8 ( @ 17:26)  T(F): 97.9, Max: 98.2 ( @ 17:26)  HR: 82 (68 - 82)  BP: 173/81 (123/59 - 173/81)  BP(mean): 117 (85 - 117)  RR: 16 (16 - 18)  SpO2: 94% (91% - 94%)  I&O's Detail    I & Os for current day (as of 2017 07:21)  =============================================  IN:    lactated ringers.: 450 ml    lactated ringers: 300 ml    Total IN: 750 ml  ---------------------------------------------  OUT:    Voided: 250 ml    Total OUT: 250 ml  ---------------------------------------------  Total NET: 500 ml    General: NAD, resting comfortably in bed  C/V: NSR  Pulm: Nonlabored breathing, no respiratory distress  Abd: soft, NT/ND  Extrem: WWP, no edema      LABS:                        9.5    7.9   )-----------( 173      ( 2017 06:11 )             30.7     2017 06:11    140    |  108    |  11     ----------------------------<  77     3.5     |  23     |  0.52     Ca    8.6        2017 06:11  Phos  1.6       2017 06:11  Mg     2.0       2017 06:11    TPro  5.6    /  Alb  2.6    /  TBili  1.0    /  DBili  x      /  AST  34     /  ALT  40     /  AlkPhos  164    2017 06:11    PT/INR - ( 2017 06:21 )   PT: 13.6 sec;   INR: 1.22          PTT - ( 2017 06:21 )  PTT:28.7 sec  Urinalysis Basic - ( 2017 01:02 )    Color: Yellow / Appearance: Clear / S.025 / pH: x  Gluc: x / Ketone: NEGATIVE  / Bili: NEGATIVE / Urobili: 0.2 E.U./dL   Blood: x / Protein: NEGATIVE mg/dL / Nitrite: NEGATIVE   Leuk Esterase: NEGATIVE / RBC: x / WBC x   Sq Epi: x / Non Sq Epi: x / Bacteria: x    RADIOLOGY & ADDITIONAL STUDIES:    MRCP: intra-and extrahepatic biliary ductal dilatation with the common bile duct measuring up to 12 mm. Multiple stones in the distal common bile duct, largest 9 mm. Cholelithiasis. Pericholecystic fluid and edematous changes suggestive of cholecystitis.

## 2017-04-05 NOTE — PROGRESS NOTE ADULT - ASSESSMENT
87F pt with PMH HTN, DM, HLD, now with mild to moderate gallstone pancreatitis    -NPO  -LR @ 150  -Strict I/O  -VS q4h  -PRN Pain/Nausea control (patient only with pain with oral intake)  -Home meds  -DVT ppx  -f/u  final MRCP  -F/U AM labs  -Possible abx 87F pt with PMH HTN, DM, HLD, now with mild to moderate gallstone pancreatitis    -NPO  -LR @ 150  -Strict I/O  -VS q4h  -PRN Pain/Nausea control (patient only with pain with oral intake)  -Home meds  -DVT ppx  -f/u  final MRCP  -F/U AM labs  -Possible abx  -ERCP

## 2017-04-05 NOTE — PROGRESS NOTE ADULT - ASSESSMENT
have had long conversations with dr moreno., pt, her family, and dr watts.  pt agrees to proceed with ercp

## 2017-04-06 ENCOUNTER — TRANSCRIPTION ENCOUNTER (OUTPATIENT)
Age: 82
End: 2017-04-06

## 2017-04-06 VITALS — TEMPERATURE: 98 F

## 2017-04-06 LAB
ALBUMIN SERPL ELPH-MCNC: 2.5 G/DL — LOW (ref 3.4–5)
ALBUMIN SERPL ELPH-MCNC: 2.7 G/DL — LOW (ref 3.4–5)
ALP SERPL-CCNC: 239 U/L — HIGH (ref 40–120)
ALP SERPL-CCNC: 253 U/L — HIGH (ref 40–120)
ALT FLD-CCNC: 35 U/L — SIGNIFICANT CHANGE UP (ref 12–42)
ALT FLD-CCNC: 36 U/L — SIGNIFICANT CHANGE UP (ref 12–42)
ANION GAP SERPL CALC-SCNC: 13 MMOL/L — SIGNIFICANT CHANGE UP (ref 9–16)
ANION GAP SERPL CALC-SCNC: 9 MMOL/L — SIGNIFICANT CHANGE UP (ref 9–16)
AST SERPL-CCNC: 26 U/L — SIGNIFICANT CHANGE UP (ref 15–37)
AST SERPL-CCNC: 37 U/L — SIGNIFICANT CHANGE UP (ref 15–37)
BILIRUB SERPL-MCNC: 0.9 MG/DL — SIGNIFICANT CHANGE UP (ref 0.2–1.2)
BILIRUB SERPL-MCNC: 1 MG/DL — SIGNIFICANT CHANGE UP (ref 0.2–1.2)
BUN SERPL-MCNC: 11 MG/DL — SIGNIFICANT CHANGE UP (ref 7–23)
BUN SERPL-MCNC: 12 MG/DL — SIGNIFICANT CHANGE UP (ref 7–23)
CALCIUM SERPL-MCNC: 8.4 MG/DL — LOW (ref 8.5–10.5)
CALCIUM SERPL-MCNC: 9 MG/DL — SIGNIFICANT CHANGE UP (ref 8.5–10.5)
CHLORIDE SERPL-SCNC: 105 MMOL/L — SIGNIFICANT CHANGE UP (ref 96–108)
CHLORIDE SERPL-SCNC: 107 MMOL/L — SIGNIFICANT CHANGE UP (ref 96–108)
CO2 SERPL-SCNC: 22 MMOL/L — SIGNIFICANT CHANGE UP (ref 22–31)
CO2 SERPL-SCNC: 24 MMOL/L — SIGNIFICANT CHANGE UP (ref 22–31)
CREAT SERPL-MCNC: 0.52 MG/DL — SIGNIFICANT CHANGE UP (ref 0.5–1.3)
CREAT SERPL-MCNC: 0.6 MG/DL — SIGNIFICANT CHANGE UP (ref 0.5–1.3)
GLUCOSE SERPL-MCNC: 72 MG/DL — SIGNIFICANT CHANGE UP (ref 70–99)
GLUCOSE SERPL-MCNC: 95 MG/DL — SIGNIFICANT CHANGE UP (ref 70–99)
HCT VFR BLD CALC: 33.1 % — LOW (ref 34.5–45)
HCT VFR BLD CALC: 34.6 % — SIGNIFICANT CHANGE UP (ref 34.5–45)
HGB BLD-MCNC: 10.3 G/DL — LOW (ref 11.5–15.5)
HGB BLD-MCNC: 11 G/DL — LOW (ref 11.5–15.5)
LIDOCAIN IGE QN: 1415 U/L — HIGH (ref 73–393)
LIDOCAIN IGE QN: 1734 U/L — HIGH (ref 73–393)
MAGNESIUM SERPL-MCNC: 1.9 MG/DL — SIGNIFICANT CHANGE UP (ref 1.6–2.4)
MCHC RBC-ENTMCNC: 22.6 PG — LOW (ref 27–34)
MCHC RBC-ENTMCNC: 22.8 PG — LOW (ref 27–34)
MCHC RBC-ENTMCNC: 31.1 G/DL — LOW (ref 32–36)
MCHC RBC-ENTMCNC: 31.8 G/DL — LOW (ref 32–36)
MCV RBC AUTO: 71.6 FL — LOW (ref 80–100)
MCV RBC AUTO: 72.7 FL — LOW (ref 80–100)
PHOSPHATE SERPL-MCNC: 3.4 MG/DL — SIGNIFICANT CHANGE UP (ref 2.5–4.5)
PLATELET # BLD AUTO: 193 K/UL — SIGNIFICANT CHANGE UP (ref 150–400)
PLATELET # BLD AUTO: 210 K/UL — SIGNIFICANT CHANGE UP (ref 150–400)
POTASSIUM SERPL-MCNC: 3.9 MMOL/L — SIGNIFICANT CHANGE UP (ref 3.5–5.3)
POTASSIUM SERPL-MCNC: 4 MMOL/L — SIGNIFICANT CHANGE UP (ref 3.5–5.3)
POTASSIUM SERPL-SCNC: 3.9 MMOL/L — SIGNIFICANT CHANGE UP (ref 3.5–5.3)
POTASSIUM SERPL-SCNC: 4 MMOL/L — SIGNIFICANT CHANGE UP (ref 3.5–5.3)
PROT SERPL-MCNC: 5.8 G/DL — LOW (ref 6.4–8.2)
PROT SERPL-MCNC: 6.1 G/DL — LOW (ref 6.4–8.2)
RBC # BLD: 4.55 M/UL — SIGNIFICANT CHANGE UP (ref 3.8–5.2)
RBC # BLD: 4.83 M/UL — SIGNIFICANT CHANGE UP (ref 3.8–5.2)
RBC # FLD: 18.2 % — HIGH (ref 10.3–16.9)
RBC # FLD: 18.4 % — HIGH (ref 10.3–16.9)
SODIUM SERPL-SCNC: 140 MMOL/L — SIGNIFICANT CHANGE UP (ref 135–145)
SODIUM SERPL-SCNC: 140 MMOL/L — SIGNIFICANT CHANGE UP (ref 135–145)
WBC # BLD: 6.6 K/UL — SIGNIFICANT CHANGE UP (ref 3.8–10.5)
WBC # BLD: 6.8 K/UL — SIGNIFICANT CHANGE UP (ref 3.8–10.5)
WBC # FLD AUTO: 6.6 K/UL — SIGNIFICANT CHANGE UP (ref 3.8–10.5)
WBC # FLD AUTO: 6.8 K/UL — SIGNIFICANT CHANGE UP (ref 3.8–10.5)

## 2017-04-06 PROCEDURE — 99232 SBSQ HOSP IP/OBS MODERATE 35: CPT

## 2017-04-06 RX ORDER — AMLODIPINE BESYLATE 2.5 MG/1
2.5 TABLET ORAL ONCE
Qty: 0 | Refills: 0 | Status: DISCONTINUED | OUTPATIENT
Start: 2017-04-06 | End: 2017-04-06

## 2017-04-06 RX ORDER — HYDRALAZINE HCL 50 MG
10 TABLET ORAL ONCE
Qty: 0 | Refills: 0 | Status: DISCONTINUED | OUTPATIENT
Start: 2017-04-06 | End: 2017-04-06

## 2017-04-06 RX ADMIN — AMLODIPINE BESYLATE 5 MILLIGRAM(S): 2.5 TABLET ORAL at 06:22

## 2017-04-06 RX ADMIN — BRIMONIDINE TARTRATE 1 DROP(S): 2 SOLUTION/ DROPS OPHTHALMIC at 06:22

## 2017-04-06 RX ADMIN — BRIMONIDINE TARTRATE 1 DROP(S): 2 SOLUTION/ DROPS OPHTHALMIC at 14:43

## 2017-04-06 NOTE — DISCHARGE NOTE ADULT - CARE PROVIDER_API CALL
Jackson Lin), Surgery  [address] [place] [address]  Phone: [phone]  Fax: [phone] Jackson Lin), Surgery  [address] [address]  Phone: [phone]  Fax: [phone]    Frank Salas), Gastroenterology  [address] [address]  Phone: [phone]  Fax: [phone]

## 2017-04-06 NOTE — DISCHARGE NOTE ADULT - PLAN OF CARE
Recovery Please continue a low fat diet  If you have pain you may take tylenol  Please return to the ED if you have fever, increasing abdominal pain, nausea, chest pain, difficulty breathing.  Please follow up with Dr. Lin in 1 week, call the office to schedule a follow up appointment.   Please follow up with Dr. Cote (gastroenterology) in 2 weeks. Call the office at [phone] to schedule an appointment. You will need a repeat ERCP in 2 months. Please continue a low fat diet  If you have pain you may take tylenol  Please return to the ED if you have fever, increasing abdominal pain, nausea, chest pain, difficulty breathing.  Please follow up with Dr. Lin in 1 week, call the office to schedule a follow up appointment.   Please follow up with Dr. Salas (gastroenterology) in 2 weeks. Call the office to schedule an appointment. You will need a repeat ERCP in 2 months.

## 2017-04-06 NOTE — PHYSICAL THERAPY INITIAL EVALUATION ADULT - RANGE OF MOTION EXAMINATION, REHAB EVAL
Right UE ROM was WFL (within functional limits)/Right LE ROM was WFL (within functional limits)/Left LE ROM was WFL (within functional limits)/Left UE ROM was WFL (within functional limits)

## 2017-04-06 NOTE — PHYSICAL THERAPY INITIAL EVALUATION ADULT - ADDITIONAL COMMENTS
15 steps to enter home, has rolling walker, lives with "family, someone is always there" 15 steps to enter home, has rolling walker, lives with "family, someone is always there". no other equipment, family performs grocery shopping

## 2017-04-06 NOTE — DISCHARGE NOTE ADULT - HOSPITAL COURSE
86 yo F with PMHx of HTN, DM, HLDA presented to [facility] ED with c/o post prandial abdominal pain and nausea. Labs done were significant for elevated lipase and liver enzymes, a RUQ ultrasound revealed cholelithiasis with a dilated CBD, patient was admitted with gallstone pancreatitis. A subsequent MRCP showed a dilated CBD with stones in the distal common bile duct. Gastroenterology service was consulted and an ERCP with stone removval and stenting performed. Diet was then advanced as tolerated, LFTs and lipase trended down. At time of discharge patient is tolerating a regular low fat diet, VSS, pain improved.

## 2017-04-06 NOTE — DISCHARGE NOTE ADULT - PATIENT PORTAL LINK FT
“You can access the Follow[employer] Patient Portal, offered by [facility], by registering with the following website: [email or web address]”

## 2017-04-06 NOTE — DISCHARGE NOTE ADULT - CARE PLAN
Principal Discharge DX:	Acute biliary pancreatitis without infection or necrosis  Goal:	Recovery  Instructions for follow-up, activity and diet:	Please continue a low fat diet  If you have pain you may take tylenol  Please return to the ED if you have fever, increasing abdominal pain, nausea, chest pain, difficulty breathing.  Please follow up with Dr. Lin in 1 week, call the office to schedule a follow up appointment.   Please follow up with Dr. Cote (gastroenterology) in 2 weeks. Call the office at [phone] to schedule an appointment. You will need a repeat ERCP in 2 months. Principal Discharge DX:	Acute biliary pancreatitis without infection or necrosis  Goal:	Recovery  Instructions for follow-up, activity and diet:	Please continue a low fat diet  If you have pain you may take tylenol  Please return to the ED if you have fever, increasing abdominal pain, nausea, chest pain, difficulty breathing.  Please follow up with Dr. Lin in 1 week, call the office to schedule a follow up appointment.   Please follow up with Dr. Salas (gastroenterology) in 2 weeks. Call the office to schedule an appointment. You will need a repeat ERCP in 2 months.

## 2017-04-06 NOTE — PROGRESS NOTE ADULT - ASSESSMENT
87 yr old female with gall stone pancreatitis s/p ERCP with decompression of CBD.     1. Gallstone Pancreatitis:  - advance diet as tolerated   - Lap song as per surgical team   - Will need repeat ERCP in 2 months for stent removal.  - reconsult GI as needed

## 2017-04-06 NOTE — PROGRESS NOTE ADULT - SUBJECTIVE AND OBJECTIVE BOX
Pt seen and examined at bedside, pt denies abd pain, nausea and vomiting.     REVIEW OF SYSTEMS:  Constitutional: No fever, weight loss or fatigue  Cardiovascular: No chest pain, palpitations, dizziness or leg swelling  Gastrointestinal: No abdominal or epigastric pain. No nausea, vomiting or hematemesis; No diarrhea or constipation. No melena or hematochezia.  Skin: No itching, burning, rashes or lesions       MEDICATIONS:  MEDICATIONS  (STANDING):  heparin  Injectable 5000Unit(s) SubCutaneous every 8 hours  amLODIPine   Tablet 5milliGRAM(s) Oral daily  pantoprazole  Injectable 40milliGRAM(s) IV Push daily  latanoprost 0.005% Ophthalmic Solution 1Drop(s) Both EYES at bedtime  ATENolol  Tablet 12.5milliGRAM(s) Oral daily  brimonidine 0.2% Ophthalmic Solution 1Drop(s) Both EYES three times a day  insulin lispro (HumaLOG) corrective regimen sliding scale  SubCutaneous every 6 hours  dextrose 5%. 1000milliLiter(s) IV Continuous <Continuous>  dextrose 50% Injectable 12.5Gram(s) IV Push once  dextrose 50% Injectable 25Gram(s) IV Push once  dextrose 50% Injectable 25Gram(s) IV Push once  lactated ringers 1000milliLiter(s) IV Continuous <Continuous>  hydrALAZINE Injectable 10milliGRAM(s) IV Push once    MEDICATIONS  (PRN):  HYDROmorphone  Injectable 0.5milliGRAM(s) IV Push every 4 hours PRN Severe Pain  ondansetron Injectable 4milliGRAM(s) IV Push every 6 hours PRN Nausea  dextrose Gel 1Dose(s) Oral once PRN Blood Glucose LESS THAN 70 milliGRAM(s)/deciliter  glucagon  Injectable 1milliGRAM(s) IntraMuscular once PRN Glucose LESS THAN 70 milligrams/deciliter      Allergies    Iodides (Anaphylaxis)    Intolerances        Vital Signs Last 24 Hrs  T(C): 35.9, Max: 36.9 (04-06 @ 05:15)  T(F): 96.7, Max: 98.5 (04-06 @ 05:15)  HR: 68 (54 - 84)  BP: 167/77 (140/64 - 178/81)  BP(mean): 110 (100 - 117)  RR: 14 (14 - 19)  SpO2: 95% (91% - 95%)  I & Os for 24h ending 04-06 @ 07:00  =============================================  IN: 2100 ml / OUT: 1275 ml / NET: 825 ml    I & Os for current day (as of 04-06 @ 17:34)  =============================================  IN: 750 ml / OUT: 800 ml / NET: -50 ml      PHYSICAL EXAM:    General: Well developed; well nourished; in no acute distress  HEENT: MMM, conjunctiva and sclera clear  Gastrointestinal: Soft non-tender non-distended; Normal bowel sounds; No hepatosplenomegaly  Skin: Warm and dry. No obvious rash    LABS:  CBC Full  -  ( 06 Apr 2017 06:17 )  WBC Count : 6.8 K/uL  Hemoglobin : 10.3 g/dL  Hematocrit : 33.1 %  Platelet Count - Automated : 193 K/uL  Mean Cell Volume : 72.7 fL  Mean Cell Hemoglobin : 22.6 pg  Mean Cell Hemoglobin Concentration : 31.1 g/dL  Auto Neutrophil # : x  Auto Lymphocyte # : x  Auto Monocyte # : x  Auto Eosinophil # : x  Auto Basophil # : x  Auto Neutrophil % : x  Auto Lymphocyte % : x  Auto Monocyte % : x  Auto Eosinophil % : x  Auto Basophil % : x    04-06    140  |  105  |  11  ----------------------------<  72  4.0   |  22  |  0.52    Ca    8.4<L>      06 Apr 2017 06:17  Phos  3.4     04-06  Mg     1.9     04-06    TPro  5.8<L>  /  Alb  2.5<L>  /  TBili  1.0  /  DBili  x   /  AST  37  /  ALT  36  /  AlkPhos  253<H>  04-06                      RADIOLOGY & ADDITIONAL STUDIES:  MRI  Cholelithiasis with choledocholithiasis causing biliary obstruction.    Evidence of acute pancreatitis with acute peripancreatic fluid collection   and small ascites.    Gallbladder distention and mild gallbladder wall thickening likely   related to acute pancreatitis and biliary obstruction.    ERCP: dilated CBD s/p sphincterotomy and extraction of two 8 mm cholesterol stones, minimal oozing at sphincterotomy with placement of fully covered stent with hemostasis achieved.

## 2017-04-06 NOTE — PROGRESS NOTE ADULT - ASSESSMENT
·	Gallstone pancreatitis s/p ERCP.  ·	Labile HTN.  ·	Hypokalemia improved.  ·	Anemia stable.    Suggest:    1. Ok for d/c when ok with surgery from nephrology perspective.  2. Keep K>4 mg>2.  3. Follow up as outpatient.    Please call with any questions.    Byron Chilel MD, FACP, FASN | kidney.[facility]  Nephrology, Hypertension, and Internal Medicine  Mobile: [phone] (Daytime Hours Only)  Office/Answering Service: [phone]  Asst. Prof. of Medicine, [facility]  [facility] - Nephrology at [address]  [address], [address], [place], [place]

## 2017-04-06 NOTE — DISCHARGE NOTE ADULT - MEDICATION SUMMARY - MEDICATIONS TO TAKE
I will START or STAY ON the medications listed below when I get home from the hospital:    Crestor 5 mg oral tablet  -- 1 tab(s) by mouth once a day (at bedtime)  -- Indication: For HLDA    Tenormin  -- 12.5 milligram(s) by mouth once a day  -- Indication: For HTN    Norvasc 5 mg oral tablet  -- 1 tab(s) by mouth once a day  -- Indication: For HTN     Alphagan 0.2% ophthalmic solution  -- 1 drop(s) to each affected eye 3 times a day  -- Indication: For glaucoma    Xalatan 0.005% ophthalmic solution  -- 1 drop(s) to each affected eye once a day (in the evening)  -- Indication: For glaucoma    NexIUM 40 mg oral delayed release capsule  -- 1 cap(s) by mouth once a day  -- Indication: For GERD

## 2017-04-06 NOTE — PROGRESS NOTE ADULT - ASSESSMENT
87F pt with PMH HTN, DM, HLD, now with mild to moderate gallstone pancreatitis    -CLD  -LR @ 150  -Strict I/O  -VS q4h  -PRN Pain/Nausea control (patient only with pain with oral intake)  -Home meds  -DVT ppx  -F/U AM labs

## 2017-04-06 NOTE — PHYSICAL THERAPY INITIAL EVALUATION ADULT - CRITERIA FOR SKILLED THERAPEUTIC INTERVENTIONS
risk reduction/prevention/therapy frequency/impairments found/predicted duration of therapy intervention/anticipated equipment needs at discharge/functional limitations in following categories/anticipated discharge recommendation

## 2017-04-06 NOTE — PHYSICAL THERAPY INITIAL EVALUATION ADULT - PERTINENT HX OF CURRENT PROBLEM, REHAB EVAL
pt w/PMH HTN, HLD, DM, glaucoma, chronic anemia, R staghorn calculi s/p ureteroscopic resection, PSH JACKIE, presents to [facility] ED sent in by Dr. White after diagnosing the patient in office with pancreatitis. The patient is accompanied by daughter and son who help translate to english, tho patient understands and speaks english well. Please see H and P for further details

## 2017-04-07 PROCEDURE — 97161 PT EVAL LOW COMPLEX 20 MIN: CPT

## 2017-04-07 PROCEDURE — 36415 COLL VENOUS BLD VENIPUNCTURE: CPT

## 2017-04-07 PROCEDURE — 80048 BASIC METABOLIC PNL TOTAL CA: CPT

## 2017-04-07 PROCEDURE — 80076 HEPATIC FUNCTION PANEL: CPT

## 2017-04-07 PROCEDURE — 99285 EMERGENCY DEPT VISIT HI MDM: CPT | Mod: 25

## 2017-04-07 PROCEDURE — 93005 ELECTROCARDIOGRAM TRACING: CPT

## 2017-04-07 PROCEDURE — 83735 ASSAY OF MAGNESIUM: CPT

## 2017-04-07 PROCEDURE — 85025 COMPLETE CBC W/AUTO DIFF WBC: CPT

## 2017-04-07 PROCEDURE — 76705 ECHO EXAM OF ABDOMEN: CPT

## 2017-04-07 PROCEDURE — 85730 THROMBOPLASTIN TIME PARTIAL: CPT

## 2017-04-07 PROCEDURE — 83036 HEMOGLOBIN GLYCOSYLATED A1C: CPT

## 2017-04-07 PROCEDURE — 74181 MRI ABDOMEN W/O CONTRAST: CPT

## 2017-04-07 PROCEDURE — 84100 ASSAY OF PHOSPHORUS: CPT

## 2017-04-07 PROCEDURE — 71045 X-RAY EXAM CHEST 1 VIEW: CPT

## 2017-04-07 PROCEDURE — 85027 COMPLETE CBC AUTOMATED: CPT

## 2017-04-07 PROCEDURE — 85610 PROTHROMBIN TIME: CPT

## 2017-04-07 PROCEDURE — 80053 COMPREHEN METABOLIC PANEL: CPT

## 2017-04-07 PROCEDURE — 83690 ASSAY OF LIPASE: CPT

## 2017-04-07 PROCEDURE — 81003 URINALYSIS AUTO W/O SCOPE: CPT

## 2017-04-10 DIAGNOSIS — Z28.21 IMMUNIZATION NOT CARRIED OUT BECAUSE OF PATIENT REFUSAL: ICD-10-CM

## 2017-04-10 DIAGNOSIS — E87.6 HYPOKALEMIA: ICD-10-CM

## 2017-04-10 DIAGNOSIS — Z90.710 ACQUIRED ABSENCE OF BOTH CERVIX AND UTERUS: ICD-10-CM

## 2017-04-10 DIAGNOSIS — E11.9 TYPE 2 DIABETES MELLITUS WITHOUT COMPLICATIONS: ICD-10-CM

## 2017-04-10 DIAGNOSIS — H40.9 UNSPECIFIED GLAUCOMA: ICD-10-CM

## 2017-04-10 DIAGNOSIS — K85.10 BILIARY ACUTE PANCREATITIS WITHOUT NECROSIS OR INFECTION: ICD-10-CM

## 2017-04-10 DIAGNOSIS — K80.71 CALCULUS OF GALLBLADDER AND BILE DUCT WITHOUT CHOLECYSTITIS WITH OBSTRUCTION: ICD-10-CM

## 2017-04-10 DIAGNOSIS — K57.10 DIVERTICULOSIS OF SMALL INTESTINE WITHOUT PERFORATION OR ABSCESS WITHOUT BLEEDING: ICD-10-CM

## 2017-04-10 DIAGNOSIS — D64.9 ANEMIA, UNSPECIFIED: ICD-10-CM

## 2017-04-10 DIAGNOSIS — Z88.8 ALLERGY STATUS TO OTHER DRUGS, MEDICAMENTS AND BIOLOGICAL SUBSTANCES STATUS: ICD-10-CM

## 2017-04-10 DIAGNOSIS — K85.90 ACUTE PANCREATITIS WITHOUT NECROSIS OR INFECTION, UNSPECIFIED: ICD-10-CM

## 2017-04-10 DIAGNOSIS — I10 ESSENTIAL (PRIMARY) HYPERTENSION: ICD-10-CM

## 2017-04-21 PROBLEM — Z00.00 ENCOUNTER FOR PREVENTIVE HEALTH EXAMINATION: Noted: 2017-04-21

## 2017-04-27 ENCOUNTER — APPOINTMENT (OUTPATIENT)
Dept: GASTROENTEROLOGY | Facility: HOSPITAL | Age: 82
End: 2017-04-27

## 2017-04-27 ENCOUNTER — OUTPATIENT (OUTPATIENT)
Dept: OUTPATIENT SERVICES | Facility: HOSPITAL | Age: 82
LOS: 1 days | Discharge: HOME | End: 2017-04-27
Payer: MEDICARE

## 2017-04-27 DIAGNOSIS — K63.89 OTHER SPECIFIED DISEASES OF INTESTINE: ICD-10-CM

## 2017-04-27 PROCEDURE — 43275 ERCP REMOVE FORGN BODY DUCT: CPT

## 2017-04-27 RX ORDER — RIFAXIMIN 550 MG/1
550 TABLET ORAL TWICE DAILY
Qty: 28 | Refills: 0 | Status: ACTIVE | COMMUNITY
Start: 2017-04-27 | End: 1900-01-01

## 2017-05-02 DIAGNOSIS — K44.9 DIAPHRAGMATIC HERNIA WITHOUT OBSTRUCTION OR GANGRENE: ICD-10-CM

## 2017-05-02 DIAGNOSIS — Z46.59 ENCOUNTER FOR FITTING AND ADJUSTMENT OF OTHER GASTROINTESTINAL APPLIANCE AND DEVICE: ICD-10-CM

## 2017-05-02 DIAGNOSIS — Z91.041 RADIOGRAPHIC DYE ALLERGY STATUS: ICD-10-CM

## 2017-05-02 DIAGNOSIS — K80.20 CALCULUS OF GALLBLADDER WITHOUT CHOLECYSTITIS WITHOUT OBSTRUCTION: ICD-10-CM

## 2018-06-26 NOTE — PROGRESS NOTE ADULT - SUBJECTIVE AND OBJECTIVE BOX
O/N: H/H 10.6/34.0 , pain controlled , patient SBP'S 170'S refuses all Hypertensive medications she reports it pain related   4/5: ERCP removed 2 stones, some bleeding- placed fully covered stent, f/u 8p CBC O/N: H/H 10.6/34.0 , pain controlled , patient SBP'S 170'S refuses all Hypertensive medications she reports it pain related   4/5: ERCP removed 2 stones, some bleeding- placed fully covered stent, f/u 8p CBC    SUBJECTIVE: Pt seen and examined at bedside. No overnight events.  Pain controlled. No f/c/n/v.     Vital Signs Last 24 Hrs  T(C): 36.9, Max: 36.9 (04-06 @ 05:15)  T(F): 98.5, Max: 98.5 (04-06 @ 05:15)  HR: 54 (54 - 84)  BP: 157/70 (157/70 - 178/81)  BP(mean): 100 (100 - 117)  RR: 18 (16 - 19)  SpO2: 93% (91% - 95%)    PHYSICAL EXAM    Gen: NAD  CV: RRR  Pulm: no resp distress  Abd: Soft, mildly tender, nondistended    I&O's Detail    I & Os for current day (as of 06 Apr 2017 07:28)  =============================================  IN:    lactated ringers: 1050 ml    Total IN: 1050 ml  ---------------------------------------------  OUT:    Voided: 1150 ml    Total OUT: 1150 ml  ---------------------------------------------  Total NET: -100 ml      LABS:                        10.3   6.8   )-----------( 193      ( 06 Apr 2017 06:17 )             33.1     04-06    140  |  105  |  11  ----------------------------<  72  4.0   |  22  |  0.52    Ca    8.4<L>      06 Apr 2017 06:17  Phos  3.4     04-06  Mg     1.9     04-06    TPro  5.8<L>  /  Alb  2.5<L>  /  TBili  1.0  /  DBili  x   /  AST  37  /  ALT  36  /  AlkPhos  253<H>  04-06          MEDICATIONS  (STANDING):  heparin  Injectable 5000Unit(s) SubCutaneous every 8 hours  amLODIPine   Tablet 5milliGRAM(s) Oral daily  pantoprazole  Injectable 40milliGRAM(s) IV Push daily  latanoprost 0.005% Ophthalmic Solution 1Drop(s) Both EYES at bedtime  ATENolol  Tablet 12.5milliGRAM(s) Oral daily  brimonidine 0.2% Ophthalmic Solution 1Drop(s) Both EYES three times a day  insulin lispro (HumaLOG) corrective regimen sliding scale  SubCutaneous every 6 hours  dextrose 5%. 1000milliLiter(s) IV Continuous <Continuous>  dextrose 50% Injectable 12.5Gram(s) IV Push once  dextrose 50% Injectable 25Gram(s) IV Push once  dextrose 50% Injectable 25Gram(s) IV Push once  lactated ringers 1000milliLiter(s) IV Continuous <Continuous>  hydrALAZINE Injectable 10milliGRAM(s) IV Push once    MEDICATIONS  (PRN):  HYDROmorphone  Injectable 0.5milliGRAM(s) IV Push every 4 hours PRN Severe Pain  ondansetron Injectable 4milliGRAM(s) IV Push every 6 hours PRN Nausea  dextrose Gel 1Dose(s) Oral once PRN Blood Glucose LESS THAN 70 milliGRAM(s)/deciliter  glucagon  Injectable 1milliGRAM(s) IntraMuscular once PRN Glucose LESS THAN 70 milligrams/deciliter      RADIOLOGY & ADDITIONAL STUDIES:    ASSESSMENT AND PLAN I’m going to ask you a few questions that I ask all my patients.

## 2018-07-15 ENCOUNTER — EMERGENCY (EMERGENCY)
Facility: HOSPITAL | Age: 83
LOS: 1 days | Discharge: HOME | End: 2018-07-15
Attending: EMERGENCY MEDICINE | Admitting: EMERGENCY MEDICINE
Payer: MEDICARE

## 2018-07-15 VITALS
OXYGEN SATURATION: 96 % | HEART RATE: 83 BPM | DIASTOLIC BLOOD PRESSURE: 72 MMHG | TEMPERATURE: 98 F | SYSTOLIC BLOOD PRESSURE: 163 MMHG | RESPIRATION RATE: 81 BRPM

## 2018-07-15 VITALS
TEMPERATURE: 98 F | OXYGEN SATURATION: 93 % | RESPIRATION RATE: 18 BRPM | HEART RATE: 93 BPM | SYSTOLIC BLOOD PRESSURE: 153 MMHG | DIASTOLIC BLOOD PRESSURE: 75 MMHG

## 2018-07-15 LAB
ALBUMIN SERPL ELPH-MCNC: 4 G/DL — SIGNIFICANT CHANGE UP (ref 3.3–5)
ALP SERPL-CCNC: 104 U/L — SIGNIFICANT CHANGE UP (ref 40–120)
ALT FLD-CCNC: 16 U/L — SIGNIFICANT CHANGE UP (ref 10–45)
ANION GAP SERPL CALC-SCNC: 15 MMOL/L — SIGNIFICANT CHANGE UP (ref 5–17)
APPEARANCE UR: CLEAR — SIGNIFICANT CHANGE UP
APTT BLD: 29.3 SEC — SIGNIFICANT CHANGE UP (ref 27.5–37.4)
AST SERPL-CCNC: 22 U/L — SIGNIFICANT CHANGE UP (ref 10–40)
BACTERIA # UR AUTO: PRESENT /HPF
BASOPHILS NFR BLD AUTO: 0.2 % — SIGNIFICANT CHANGE UP (ref 0–2)
BILIRUB SERPL-MCNC: 0.3 MG/DL — SIGNIFICANT CHANGE UP (ref 0.2–1.2)
BILIRUB UR-MCNC: NEGATIVE — SIGNIFICANT CHANGE UP
BLD GP AB SCN SERPL QL: NEGATIVE — SIGNIFICANT CHANGE UP
BUN SERPL-MCNC: 30 MG/DL — HIGH (ref 7–23)
CALCIUM SERPL-MCNC: 9.6 MG/DL — SIGNIFICANT CHANGE UP (ref 8.4–10.5)
CHLORIDE SERPL-SCNC: 105 MMOL/L — SIGNIFICANT CHANGE UP (ref 96–108)
CO2 SERPL-SCNC: 22 MMOL/L — SIGNIFICANT CHANGE UP (ref 22–31)
COLOR SPEC: YELLOW — SIGNIFICANT CHANGE UP
CREAT SERPL-MCNC: 0.77 MG/DL — SIGNIFICANT CHANGE UP (ref 0.5–1.3)
DIFF PNL FLD: NEGATIVE — SIGNIFICANT CHANGE UP
EOSINOPHIL NFR BLD AUTO: 0.5 % — SIGNIFICANT CHANGE UP (ref 0–6)
EPI CELLS # UR: SIGNIFICANT CHANGE UP /HPF (ref 0–5)
GLUCOSE SERPL-MCNC: 128 MG/DL — HIGH (ref 70–99)
GLUCOSE UR QL: NEGATIVE — SIGNIFICANT CHANGE UP
HCT VFR BLD CALC: 35.5 % — SIGNIFICANT CHANGE UP (ref 34.5–45)
HGB BLD-MCNC: 10.7 G/DL — LOW (ref 11.5–15.5)
INR BLD: 0.95 — SIGNIFICANT CHANGE UP (ref 0.88–1.16)
KETONES UR-MCNC: NEGATIVE — SIGNIFICANT CHANGE UP
LEUKOCYTE ESTERASE UR-ACNC: NEGATIVE — SIGNIFICANT CHANGE UP
LYMPHOCYTES # BLD AUTO: 20.8 % — SIGNIFICANT CHANGE UP (ref 13–44)
MCHC RBC-ENTMCNC: 23.1 PG — LOW (ref 27–34)
MCHC RBC-ENTMCNC: 30.1 G/DL — LOW (ref 32–36)
MCV RBC AUTO: 76.7 FL — LOW (ref 80–100)
MONOCYTES NFR BLD AUTO: 6.1 % — SIGNIFICANT CHANGE UP (ref 2–14)
NEUTROPHILS NFR BLD AUTO: 72.4 % — SIGNIFICANT CHANGE UP (ref 43–77)
NITRITE UR-MCNC: POSITIVE
PH UR: 6 — SIGNIFICANT CHANGE UP (ref 5–8)
PLATELET # BLD AUTO: 187 K/UL — SIGNIFICANT CHANGE UP (ref 150–400)
POTASSIUM SERPL-MCNC: 4.2 MMOL/L — SIGNIFICANT CHANGE UP (ref 3.5–5.3)
POTASSIUM SERPL-SCNC: 4.2 MMOL/L — SIGNIFICANT CHANGE UP (ref 3.5–5.3)
PROT SERPL-MCNC: 6.7 G/DL — SIGNIFICANT CHANGE UP (ref 6–8.3)
PROT UR-MCNC: NEGATIVE MG/DL — SIGNIFICANT CHANGE UP
PROTHROM AB SERPL-ACNC: 10.5 SEC — SIGNIFICANT CHANGE UP (ref 9.8–12.7)
RBC # BLD: 4.63 M/UL — SIGNIFICANT CHANGE UP (ref 3.8–5.2)
RBC # FLD: 16.9 % — SIGNIFICANT CHANGE UP (ref 10.3–16.9)
RBC CASTS # UR COMP ASSIST: < 5 /HPF — SIGNIFICANT CHANGE UP
RH IG SCN BLD-IMP: POSITIVE — SIGNIFICANT CHANGE UP
SODIUM SERPL-SCNC: 142 MMOL/L — SIGNIFICANT CHANGE UP (ref 135–145)
SP GR SPEC: <=1.005 — SIGNIFICANT CHANGE UP (ref 1–1.03)
UROBILINOGEN FLD QL: 0.2 E.U./DL — SIGNIFICANT CHANGE UP
WBC # BLD: 9.5 K/UL — SIGNIFICANT CHANGE UP (ref 3.8–10.5)
WBC # FLD AUTO: 9.5 K/UL — SIGNIFICANT CHANGE UP (ref 3.8–10.5)
WBC UR QL: < 5 /HPF — SIGNIFICANT CHANGE UP

## 2018-07-15 PROCEDURE — 99284 EMERGENCY DEPT VISIT MOD MDM: CPT | Mod: 25

## 2018-07-15 PROCEDURE — 73522 X-RAY EXAM HIPS BI 3-4 VIEWS: CPT | Mod: 26

## 2018-07-15 PROCEDURE — 85025 COMPLETE CBC W/AUTO DIFF WBC: CPT

## 2018-07-15 PROCEDURE — 87186 SC STD MICRODIL/AGAR DIL: CPT

## 2018-07-15 PROCEDURE — 87086 URINE CULTURE/COLONY COUNT: CPT

## 2018-07-15 PROCEDURE — 72192 CT PELVIS W/O DYE: CPT | Mod: 26

## 2018-07-15 PROCEDURE — 72192 CT PELVIS W/O DYE: CPT

## 2018-07-15 PROCEDURE — 71045 X-RAY EXAM CHEST 1 VIEW: CPT

## 2018-07-15 PROCEDURE — 86850 RBC ANTIBODY SCREEN: CPT

## 2018-07-15 PROCEDURE — 85730 THROMBOPLASTIN TIME PARTIAL: CPT

## 2018-07-15 PROCEDURE — 80053 COMPREHEN METABOLIC PANEL: CPT

## 2018-07-15 PROCEDURE — 86900 BLOOD TYPING SEROLOGIC ABO: CPT

## 2018-07-15 PROCEDURE — 73522 X-RAY EXAM HIPS BI 3-4 VIEWS: CPT

## 2018-07-15 PROCEDURE — 86901 BLOOD TYPING SEROLOGIC RH(D): CPT

## 2018-07-15 PROCEDURE — 99284 EMERGENCY DEPT VISIT MOD MDM: CPT

## 2018-07-15 PROCEDURE — 81001 URINALYSIS AUTO W/SCOPE: CPT

## 2018-07-15 PROCEDURE — 93005 ELECTROCARDIOGRAM TRACING: CPT

## 2018-07-15 PROCEDURE — 85610 PROTHROMBIN TIME: CPT

## 2018-07-15 PROCEDURE — 36415 COLL VENOUS BLD VENIPUNCTURE: CPT

## 2018-07-15 PROCEDURE — 71045 X-RAY EXAM CHEST 1 VIEW: CPT | Mod: 26

## 2018-07-15 NOTE — ED PROVIDER NOTE - MUSCULOSKELETAL, MLM
right lower ext - shortened and rotated, pain with extension at hip, no knee of ankle tenderness, no distal nv deficit, left lower ext wnl, no midline neck or back tenderness

## 2018-07-15 NOTE — ED ADULT TRIAGE NOTE - ARRIVAL INFO ADDITIONAL COMMENTS
pt sent from urgent care after losing her balance while being walked by her family and fell.  c/o right knee and hip pain.  was able to get up and walk with assist after fall.  no rotation or shortening noted.

## 2018-07-15 NOTE — CONSULT NOTE ADULT - SUBJECTIVE AND OBJECTIVE BOX
Pt Name: RAJANI HARRINGTON  MRN: [id number]    87 y/o woman with PMH of HTN, HLD, glaucoma who presents to ED with mechanical fall onto right side. At baseline patient ambulates with walker but was at home and fell in her kitchen when this happened, witnessed fall by family. Denies head trauma, loss of consciousness any other trauma.     ROS is otherwise negative.  PAST MEDICAL & SURGICAL HISTORY:  Glaucoma: Glaucoma  Essential hypertension: HTN (hypertension)  Calculus of kidney: Staghorn calculus  Status post total hysterectomy: History of hysterectomy      Allergies: NKDA    Medications:      Social History:  Ambulation: Walking independently    PHYSICAL EXAM:    T(C): 36.9 (07-15-18 @ 04:00), Max: 36.9 (07-15-18 @ 04:00)  HR: 93 (07-15-18 @ 04:00) (83 - 93)  BP: 153/75 (07-15-18 @ 04:00) (153/75 - 163/72)  RR: 18 (07-15-18 @ 04:00) (18 - 18)  SpO2: 93% (07-15-18 @ 04:00) (93% - 96%)  Wt(kg): --    Gen: well developed, well nourished, comfortable  Affected extremity:   RLE  slightly swollen RLE  TTP along hip region  no cuts, lacerations, open fracture       Motor: firing GS/TA/EHL equally      Sensation: SILT sp/dp/castro/saph/t      wwp <2 sec cap refill, DP/PT pulses 2+  Pain on AROM/PROM of hip, limited exam  positive heel strike     Labs:                        10.7   9.5   )-----------( 187      ( 15 Jul 2018 02:30 )             35.5     07-15    142  |  105  |  30<H>  ----------------------------<  128<H>  4.2   |  22  |  0.77    Ca    9.6      15 Jul 2018 02:30    TPro  6.7  /  Alb  4.0  /  TBili  0.3  /  DBili  x   /  AST  22  /  ALT  16  /  AlkPhos  104  07-15      XR of R hip: Fx of R greater trochanter appreciated    CT of Pelvis:  Nondisplaced fracture of R greater trochanger    A/P  Pt is an 87 y/o woman with PMH of HTN, HLD, glaucoma who presents to ED with R greater trochanter fracture. At this time, there is no indication for an acute orthopaedic intervention.     -WBAT RLE  -Can be admitted to medicine if desired  -PT/Rehab  -Pain control  -Return to ED if experiencing pain out of proportion in R hip, sensation loss, numbness/tingling in RLE    d/w attending on call, Dr. Strauss

## 2018-07-15 NOTE — ED PROVIDER NOTE - OBJECTIVE STATEMENT
87 y/o f with PMH of HTN, HLD presents to ED with mechanical fall onto right side.  Pt states she normally ambulates with walker but was not using walker when she fell.  Denies head trauma, neck pain, back pain.  Pt not on anticoagulation.  Unable to ambulate.

## 2018-07-15 NOTE — ED PROVIDER NOTE - MEDICAL DECISION MAKING DETAILS
Infant remains on room air, microflow nasal cannula with PO feeds. Infant PO fed when awake and interested. Infant awake before feeds so far, attempting to bottle feed, infant tires out around 30mls and falls asleep.  Tried infant with both blue and green n pt with mechanical fall c/p right hip pain, xray with greater trochanter fx, ortho called, requests ct - ct with nondisplaced fx, as per ortho no surgical intervention required, pt offered admission for PT consult tomorrow but family refuses and states they will get PT at home.  Declines admission.  Family states they will contact [facility] tomorrow.

## 2018-07-17 LAB
-  AMPICILLIN/SULBACTAM: SIGNIFICANT CHANGE UP
-  AMPICILLIN: SIGNIFICANT CHANGE UP
-  CEFAZOLIN: SIGNIFICANT CHANGE UP
-  CEFTRIAXONE: SIGNIFICANT CHANGE UP
-  CIPROFLOXACIN: SIGNIFICANT CHANGE UP
-  GENTAMICIN: SIGNIFICANT CHANGE UP
-  NITROFURANTOIN: SIGNIFICANT CHANGE UP
-  PIPERACILLIN/TAZOBACTAM: SIGNIFICANT CHANGE UP
-  TOBRAMYCIN: SIGNIFICANT CHANGE UP
-  TRIMETHOPRIM/SULFAMETHOXAZOLE: SIGNIFICANT CHANGE UP
CULTURE RESULTS: SIGNIFICANT CHANGE UP
METHOD TYPE: SIGNIFICANT CHANGE UP
ORGANISM # SPEC MICROSCOPIC CNT: SIGNIFICANT CHANGE UP
ORGANISM # SPEC MICROSCOPIC CNT: SIGNIFICANT CHANGE UP
SPECIMEN SOURCE: SIGNIFICANT CHANGE UP

## 2018-07-19 DIAGNOSIS — M25.551 PAIN IN RIGHT HIP: ICD-10-CM

## 2018-07-19 DIAGNOSIS — S72.114A NONDISPLACED FRACTURE OF GREATER TROCHANTER OF RIGHT FEMUR, INITIAL ENCOUNTER FOR CLOSED FRACTURE: ICD-10-CM
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
## 2018-07-19 DIAGNOSIS — Z91.041 RADIOGRAPHIC DYE ALLERGY STATUS: ICD-10-CM
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
## 2018-07-19 DIAGNOSIS — Z88.8 ALLERGY STATUS TO OTHER DRUGS, MEDICAMENTS AND BIOLOGICAL SUBSTANCES STATUS: ICD-10-CM

## 2018-07-19 DIAGNOSIS — I10 ESSENTIAL (PRIMARY) HYPERTENSION: ICD-10-CM

## 2018-07-25 ENCOUNTER — APPOINTMENT (OUTPATIENT)
Dept: VASCULAR SURGERY | Facility: CLINIC | Age: 83
End: 2018-07-25
Payer: MEDICARE

## 2018-07-25 DIAGNOSIS — R58 HEMORRHAGE, NOT ELSEWHERE CLASSIFIED: ICD-10-CM

## 2018-07-25 DIAGNOSIS — Z91.81 HISTORY OF FALLING: ICD-10-CM

## 2018-07-25 PROCEDURE — 99214 OFFICE O/P EST MOD 30 MIN: CPT

## 2018-07-25 PROCEDURE — 93971 EXTREMITY STUDY: CPT

## 2018-07-26 PROBLEM — Z91.81 STATUS POST FALL: Status: ACTIVE | Noted: 2018-07-26

## 2018-07-26 PROBLEM — R58 ECCHYMOSIS: Status: ACTIVE | Noted: 2018-07-26

## 2018-07-26 RX ORDER — AMLODIPINE BESYLATE 5 MG/1
5 TABLET ORAL
Qty: 90 | Refills: 0 | Status: ACTIVE | COMMUNITY
Start: 2018-02-27

## 2018-07-26 RX ORDER — BLOOD SUGAR DIAGNOSTIC
STRIP MISCELLANEOUS
Qty: 100 | Refills: 0 | Status: ACTIVE | COMMUNITY
Start: 2017-07-23

## 2018-07-26 RX ORDER — LEVOFLOXACIN 500 MG/1
500 TABLET, FILM COATED ORAL
Qty: 7 | Refills: 0 | Status: ACTIVE | COMMUNITY
Start: 2018-02-27

## 2019-01-22 ENCOUNTER — RESULT REVIEW (OUTPATIENT)
Age: 84
End: 2019-01-22

## 2019-09-25 NOTE — PHYSICAL THERAPY INITIAL EVALUATION ADULT - GAIT DISTANCE, PT EVAL
Please inform patient of negative urine culture. She needs to schedule next visit with MDs due to history of c/s x2. Please make sure she has a 20 minute appt slot, need Citizen of [place] .     JANELL Velazquez, NAUNM  
150 feet

## 2020-03-04 NOTE — ED PROVIDER NOTE - MEDICAL DECISION MAKING DETAILS
Anesthesia Pre Eval Note    Anesthesia ROS/Med Hx    Overall Review:  EKG was reviewed and Echo was reviewed       Pulmonary Review:  Comments: Pulmonary fibrosis,     Neuro/Psych Review:  Comments: dementia       Cardiovascular Review:  Exercise tolerance: poor (<4 METS)  Positive for pulmonary hypertension    GI/HEPATIC/RENAL Review:    Positive for GERD    End/Other Review:    Positive for hypothyroidism    Overall Review of Systems Comments:  Pulmonary fibrosis-PFT;s 4/19-within normal limits FEV1-90%, FVC-73%, FEV1/FVC ratio-81%  ECHO today-EF-63%, severe pulmonary HTN, PASP-70      Relevant Problems   No relevant active problems       Physical Exam     Airway     TM Distance: >3 FB  Neck ROM: Limited    Cardiovascular    Cardio Rhythm: Regular  Cardio Rate: Normal    General Assessment    General Assessment Note: Sedated, does respond to stimulation with eye opening, does not readily answer questions      Dental Exam    Patient has:  Upper dentures and Lower dentures    Pulmonary Exam    Patient Demonstrates:  Rhonchi      Anesthesia Plan    ASA Status: 4    Anesthesia Type: Spinal    Premedication: None    Checklist  Reviewed: Lab Results, Patient Summary, Allergies, Past Med History, Medications, Problem list, NPO Status, Consultations, Chest X-Rays, EKG, Care Everywhere and DNR Status    Informed Consent  The proposed anesthetic plan, including its risks and benefits, have been discussed with the Healthcare Power of   - along with the risks and benefits of alternatives.  Questions were encouraged and answered and the patient and/or representative understands and agrees to proceed.     Informed Consent for Blood: Consented    Comments  Plan Comments: Risks include but not limited to n/v, sore/dry throat, eye or dental injury, cardiac or respiratory complications, need for blood transfusion.  Pt understands and wishes to proceed.  Risks of spinal anesthetic include but not limited to back pain, PDPH,  nerve damage, hypotension, failed block, high spinal requiring intubation.  POA and patient understand and wish to proceed with a spinal anestheic     88 y/o f hx HTN presents with n/v, abd pain; labs show lipase >7000, elevated transaminases, u/s shows gallstones and dilated cbd.  Pt afebrile with no leukocytosis, evaluated by surgery and will admit, NPO, IV hydration, likely to get MRCP.

## 2021-12-01 NOTE — PHYSICAL THERAPY INITIAL EVALUATION ADULT - LEVEL OF INDEPENDENCE: SIT/STAND, REHAB EVAL
SANJAY  Bettina Montes is a 52 year old female with a past medical history of HFrEF (30-35%) secondary to NICM (felt to be idiopathic), HTN, obesity, DM II, cervical spine fusion, and hyperlipidemia who presents for routine follow-up. She was last seen by Dr. Chinchilla on 6/5/20.    Bettina had her last CORE visit on 9/29/21. She feels a little off with her vision.  She has been seeing the [facility] for  DM management.  She had a decrease in her metformin. She is on a higher dose of Trulicity. She had yeast infections with the Farxiga so they discontinued it. She says her BS are very labile. She has been having some palpitations she says. They are not present all the time. She has some SOB at rest - same as before.  She says there is now no swelling in her legs or abdomen. Home weight 280-285 lbs. She endorses a big improvement in her nightly PND, cough, and orthopnea. Her appetite has been fine. She denies early satiety.   Continues to self titrate her afternoon diuretic doses. Takes 60 mg every morning of Torsemide     She denies any chest pain, lightheadedness, dizziness, or near syncopal episodes.     Pt is taking hydralazine 50mg BID, coreg 25 BID, entresto 97/103 BID, spironolactone 50 qday and torsemide 60mg qday or BID prn.       PMH  Past Medical History:   Diagnosis Date     Abnormal Pap smear of cervix 09/10/2019    See problem list     Adrenal gland anomaly      CHF (congestive heart failure) (H)      Fatty liver      Gastroesophageal reflux disease      Hyperlipidemia      Hypertension      Mild persistent asthma      NICM (nonischemic cardiomyopathy) (H)      Obesity      WILLARD (obstructive sleep apnea) 1/9/2015     Type 2 diabetes mellitus (H)        Past Surgical History:   Procedure Laterality Date     COLONOSCOPY       DISCECTOMY, FUSION CERVICAL ANTERIOR TWO LEVELS, COMBINED N/A 4/5/2019    Procedure: C4-6 anterior cervical discectomy and fusion;  Surgeon: Hollis Hurtado MD;  Location:  OR      TUBAL LIGATION         Family History   Problem Relation Age of Onset     Diabetes Mother      Hypertension Mother      Hyperlipidemia Mother      Heart Failure Mother      Diabetes Father      Cancer Paternal Grandmother         ?       Social History     Socioeconomic History     Marital status: Single     Spouse name: None     Number of children: 1     Years of education: None     Highest education level: None   Occupational History     Occupation: CNA     Employer: OTHER   Social Needs     Financial resource strain: None     Food insecurity:     Worry: None     Inability: None     Transportation needs:     Medical: None     Non-medical: None   Tobacco Use     Smoking status: Former Smoker     Packs/day: 1.00     Years: 30.00     Pack years: 30.00     Types: Cigarettes     Last attempt to quit: 2013     Years since quittin.8     Smokeless tobacco: Former User     Tobacco comment:     Substance and Sexual Activity     Alcohol use: Yes     Alcohol/week: 0.0 standard drinks     Comment: 1     Drug use: No     Sexual activity: Yes     Partners: Male     Birth control/protection: Surgical   Lifestyle     Physical activity:     Days per week: None     Minutes per session: None     Stress: None   Relationships     Social connections:     Talks on phone: None     Gets together: None     Attends Congregation service: None     Active member of club or organization: None     Attends meetings of clubs or organizations: None     Relationship status: None     Intimate partner violence:     Fear of current or ex partner: None     Emotionally abused: None     Physically abused: None     Forced sexual activity: None   Other Topics Concern     Parent/sibling w/ CABG, MI or angioplasty before 65F 55M? No   Social History Narrative     None       ALLERGIES  Allergies   Allergen Reactions     Amlodipine Swelling     Edema on 5mg throat     Lisinopril      Swelling in throat, face  angioedema     Naprosyn [Naproxen]       Swelling, diff breathing       MEDICATIONS acetaminophen (TYLENOL) 500 MG tablet, Take 500-1,000 mg by mouth every 6 hours as needed for mild pain  blood glucose (NO BRAND SPECIFIED) test strip, Use to test blood sugar 3-4 times daily or as directed.  carvedilol (COREG) 25 MG tablet, Take 1 tablet (25 mg) by mouth 2 times daily (with meals)  Chlorpheniramine-DM (CORICIDIN HBP COUGH/COLD) 4-30 MG TABS, Take 1 tablet by mouth as needed  cholecalciferol ( ULTRA STRENGTH) 2000 units CAPS, Take 2,000 Units by mouth daily   dextromethorphan-guaiFENesin (MUCINEX DM)  MG 12 hr tablet, Take 1 tablet by mouth daily  Dulaglutide (TRULICITY) 4.5 MG/0.5ML SOPN, Inject 4.5 mg Subcutaneous once a week  fluticasone-salmeterol (WIXELA INHUB) 250-50 MCG/DOSE inhaler, Inhale 1 puff into the lungs 2 times daily   hydrALAZINE (APRESOLINE) 50 MG tablet, Take 1 tablet (50 mg) by mouth 2 times daily  insulin aspart (NOVOLOG FLEXPEN) 100 UNIT/ML pen, Per sliding scale before meals and at bedtime. 100-150 1U, 151-200 2U, 201-250 4U, 251-300 6U, 301-350, 8U, 351-400 10U, >400 12U  insulin glargine (BASAGLAR KWIKPEN) 100 UNIT/ML pen, Inject 75 Units Subcutaneous At Bedtime  insulin pen needle (ULTICARE MICRO) 32G X 4 MM miscellaneous, Use 4 pen needles daily or as directed.  levalbuterol (XOPENEX HFA) 45 MCG/ACT Inhaler, Inhale 2 puffs into the lungs every 4 hours as needed for shortness of breath / dyspnea or wheezing  lovastatin (MEVACOR) 20 MG tablet, TAKE 1 TABLET BY MOUTH EVERYDAY AT BEDTIME  magnesium oxide (MAG-OX) 400 (241.3 Mg) MG tablet, Take 1 tablet (400 mg) by mouth daily  metFORMIN (GLUCOPHAGE-XR) 500 MG 24 hr tablet, Take 2 tablets (1,000 mg) by mouth daily (with dinner)  montelukast (SINGULAIR) 10 MG tablet, Take 1 tablet (10 mg) by mouth At Bedtime  potassium chloride ER (K-TAB/KLOR-CON) 10 MEQ CR tablet, TAKE 3 TABLETS (30 MEQ) BY MOUTH DAILY  sacubitril-valsartan (ENTRESTO)  MG per tablet, Take 1 tablet by  mouth 2 times daily  spironolactone (ALDACTONE) 50 MG tablet, Take 1 tablet (50 mg) by mouth daily  tiZANidine (ZANAFLEX) 2 MG tablet, TAKE 1-2 TABLETS BY MOUTH 3 TIMES DAILY AS NEEDED FOR MUSCLE SPASMS  torsemide (DEMADEX) 20 MG tablet, Take 3 tablets (60 mg) by mouth 2 times daily (Patient taking differently: Take 60 mg by mouth 2 times daily Takes 60mg in the morning and if short of breath or edema will take 60mg twice daily)    sodium chloride (PF) 0.9% PF flush 10 mL            ROS:   Constitutional: No fever, chills, or sweats.  ENT: No visual disturbance, ear ache, epistaxis, sore throat.   Allergies/Immunologic: Negative  Respiratory: No cough, hemoptysis.   Cardiovascular: As per HPI.   GI: As per HPI.   : No urinary frequency, dysuria, or hematuria.   Integument: Negative.   Psychiatric: Negative.   Neuro: Negative.   Endocrinology: negative   Musculoskeletal: negative    EXAM:   General: appears comfortable, alert and articulate  Head: normocephalic, atraumatic  Eyes: anicteric sclera, EOMI  Neck: Supple, no adenopathy  Orophyarynx: moist mucosa, no cyanosis  Heart: regular, S1/S2, no murmur, gallop, rub, estimated JVP not detected at 90 degrees  Lungs: Respirations even and unlabored, lungs clear, no rales or wheezing.  Lungs sounds decreased bilaterally in the bases.  Abdomen:  distended and firmer, non-tender, bowel sounds present, no hepatomegaly  Extremities: no clubbing, cyanosis. Has trace edema  Neurological: normal speech and affect, no gross motor deficits  Skin:  Warm and dry.      LABS  Last Comprehensive Metabolic Panel:  Sodium   Date Value Ref Range Status   09/29/2021 138 133 - 144 mmol/L Final   03/23/2021 133 133 - 144 mmol/L Final     Potassium   Date Value Ref Range Status   09/29/2021 4.3 3.4 - 5.3 mmol/L Final   03/23/2021 3.8 3.4 - 5.3 mmol/L Final     Chloride   Date Value Ref Range Status   09/29/2021 103 94 - 109 mmol/L Final   03/23/2021 95 94 - 109 mmol/L Final     Carbon  Dioxide   Date Value Ref Range Status   03/23/2021 35 (H) 20 - 32 mmol/L Final     Carbon Dioxide (CO2)   Date Value Ref Range Status   09/29/2021 29 20 - 32 mmol/L Final     Anion Gap   Date Value Ref Range Status   09/29/2021 6 3 - 14 mmol/L Final   03/23/2021 3 3 - 14 mmol/L Final     Glucose   Date Value Ref Range Status   09/29/2021 104 (H) 70 - 99 mg/dL Final   03/23/2021 260 (H) 70 - 99 mg/dL Final     Urea Nitrogen   Date Value Ref Range Status   09/29/2021 11 7 - 30 mg/dL Final   03/23/2021 24 7 - 30 mg/dL Final     Creatinine   Date Value Ref Range Status   09/29/2021 1.11 (H) 0.52 - 1.04 mg/dL Final   03/23/2021 1.18 (H) 0.52 - 1.04 mg/dL Final     GFR Estimate   Date Value Ref Range Status   09/29/2021 57 (L) >60 mL/min/1.73m2 Final     Comment:     As of July 11, 2021, eGFR is calculated by the CKD-EPI creatinine equation, without race adjustment. eGFR can be influenced by muscle mass, exercise, and diet. The reported eGFR is an estimation only and is only applicable if the renal function is stable.   03/23/2021 53 (L) >60 mL/min/[1.73_m2] Final     Comment:     Non  GFR Calc  Starting 12/18/2018, serum creatinine based estimated GFR (eGFR) will be   calculated using the Chronic Kidney Disease Epidemiology Collaboration   (CKD-EPI) equation.       Calcium   Date Value Ref Range Status   09/29/2021 9.2 8.5 - 10.1 mg/dL Final   03/23/2021 9.9 8.5 - 10.1 mg/dL Final       MOST RECENT ECHOCARDIOGRAM 10/25/19:  Interpretation Summary  Technically difficult study.  Left ventricular size is normal. Mildly (EF 40-45%, traced 45%) reduced left ventricular function is present.  Right ventricular function, chamber size, wall motion, and thickness are normal. This study was compared with the study from 3/21/18: The left ventricular function has improved    ASSESSMENT AND PLAN  Bettina Montes is a 52 year old female with NICM who appears euvolemic today. She has been at [facility] for her HF.  Unfortunately she couldn't tolerate the dapaglifozin. We will continue her Torsemide at 60 mg am and use in the pm if needed.  We will decrease the Hydralazine to 25 mg BID. We reviewed labs - glucose is elevated- she will reach out to the pharmacist.  Her creatinine is elevated and we will have her take less torsemide and hydrate well as she hasn't had anything to eat or drink today .  She will get her labs rechecked Friday.       1. Chronic systolic heart failure/HFrEF (EF 40-45%) secondary to nonischemic cardiomyopathy  NYHA Symptom Class IIIB  Stage C  Primary Cardiologist: Dr Chinchilla; Last seen 6/5/20  ACE-I/ARB/ARNi:  Entresto  mg- titration complete  BB yes, Carvedilol 25 mg twice daily- titration complete  Aldosterone antagonist yes, Spironolactone 50 mg daily.   SCD prophylaxis EF > 35%   Fluid status Euvolemic  Cardiac Rehab: Completed  Sleep Apnea Evaluation: Documented sleep apnea.    Remote monitoring: Mychart active  Antiplatelet: none.   Anticoagulation: None.  SGLT II- unable to tolerate the dapaglifozin- frequent yeast infections.     # Obesity: BMI 47.29.  She is working on weight loss.  Continue to reassess at subsequent visits.     #  HTN:   continue Entresto   Continue to monitor BP readings at home.      #. Shoulder surgery. - October - 4.  Follow-up:  Stop potassium   Decrease Torsemide to 40 mg today only then resume regular dose starting tomorrow  Hydralazine 25 mg BID -   BMP on Friday   BP - checks 2-3 times a week    CORE in one month -         Heide GU NP-C         minimum assist (75% patients effort)
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
## 2024-08-28 NOTE — ED PROVIDER NOTE - PRINCIPAL DIAGNOSIS
General VM left requesting a call back to discuss Rx refill request from pharmacy. Clinic phone left in message   Pancreatitis

## 2025-07-29 NOTE — PHYSICAL THERAPY INITIAL EVALUATION ADULT - GAIT DEVIATIONS NOTED, PT EVAL
Initiate Treatment: podofilox 0.5 % topical solution \\nSig: Apply to warts 3 nights on and 4 days off . Repeat. If tolerated use nightly.
Detail Level: Zone
decreased step length/decreased stride length/increased time in double stance